# Patient Record
Sex: MALE | Race: WHITE | NOT HISPANIC OR LATINO | Employment: FULL TIME | URBAN - METROPOLITAN AREA
[De-identification: names, ages, dates, MRNs, and addresses within clinical notes are randomized per-mention and may not be internally consistent; named-entity substitution may affect disease eponyms.]

---

## 2017-05-08 ENCOUNTER — OFFICE VISIT - HEALTHEAST (OUTPATIENT)
Dept: SLEEP MEDICINE | Facility: CLINIC | Age: 32
End: 2017-05-08

## 2017-05-08 ENCOUNTER — AMBULATORY - HEALTHEAST (OUTPATIENT)
Dept: SLEEP MEDICINE | Facility: CLINIC | Age: 32
End: 2017-05-08

## 2017-05-08 DIAGNOSIS — G47.33 OSA ON CPAP: ICD-10-CM

## 2017-05-08 ASSESSMENT — MIFFLIN-ST. JEOR: SCORE: 2097.68

## 2017-05-15 ENCOUNTER — AMBULATORY - HEALTHEAST (OUTPATIENT)
Dept: SLEEP MEDICINE | Facility: CLINIC | Age: 32
End: 2017-05-15

## 2017-05-22 ENCOUNTER — AMBULATORY - HEALTHEAST (OUTPATIENT)
Dept: SLEEP MEDICINE | Facility: CLINIC | Age: 32
End: 2017-05-22

## 2017-05-22 ENCOUNTER — COMMUNICATION - HEALTHEAST (OUTPATIENT)
Dept: SLEEP MEDICINE | Facility: CLINIC | Age: 32
End: 2017-05-22

## 2017-06-08 ENCOUNTER — RECORDS - HEALTHEAST (OUTPATIENT)
Dept: ADMINISTRATIVE | Facility: OTHER | Age: 32
End: 2017-06-08

## 2017-06-16 ENCOUNTER — COMMUNICATION - HEALTHEAST (OUTPATIENT)
Dept: FAMILY MEDICINE | Facility: CLINIC | Age: 32
End: 2017-06-16

## 2017-07-11 ENCOUNTER — COMMUNICATION - HEALTHEAST (OUTPATIENT)
Dept: FAMILY MEDICINE | Facility: CLINIC | Age: 32
End: 2017-07-11

## 2017-08-03 ENCOUNTER — OFFICE VISIT - HEALTHEAST (OUTPATIENT)
Dept: FAMILY MEDICINE | Facility: CLINIC | Age: 32
End: 2017-08-03

## 2017-08-03 DIAGNOSIS — R20.0 BILATERAL HAND NUMBNESS: ICD-10-CM

## 2017-08-03 DIAGNOSIS — R20.0 THIGH NUMBNESS: ICD-10-CM

## 2017-08-03 RX ORDER — GLUCOSAMINE SULFATE 500 MG
500 CAPSULE ORAL 3 TIMES DAILY
Status: SHIPPED | COMMUNITY
Start: 2017-08-03 | End: 2023-05-12

## 2017-08-03 ASSESSMENT — MIFFLIN-ST. JEOR: SCORE: 2055.04

## 2017-10-03 ENCOUNTER — RECORDS - HEALTHEAST (OUTPATIENT)
Dept: ADMINISTRATIVE | Facility: OTHER | Age: 32
End: 2017-10-03

## 2017-11-07 ENCOUNTER — OFFICE VISIT - HEALTHEAST (OUTPATIENT)
Dept: FAMILY MEDICINE | Facility: CLINIC | Age: 32
End: 2017-11-07

## 2017-11-07 ENCOUNTER — RECORDS - HEALTHEAST (OUTPATIENT)
Dept: GENERAL RADIOLOGY | Facility: CLINIC | Age: 32
End: 2017-11-07

## 2017-11-07 DIAGNOSIS — R09.82 POSTNASAL DRIP: ICD-10-CM

## 2017-11-07 DIAGNOSIS — R05.9 COUGH: ICD-10-CM

## 2018-01-09 ENCOUNTER — RECORDS - HEALTHEAST (OUTPATIENT)
Dept: ADMINISTRATIVE | Facility: OTHER | Age: 33
End: 2018-01-09

## 2018-01-25 ENCOUNTER — RECORDS - HEALTHEAST (OUTPATIENT)
Dept: ADMINISTRATIVE | Facility: OTHER | Age: 33
End: 2018-01-25

## 2018-04-04 ENCOUNTER — RECORDS - HEALTHEAST (OUTPATIENT)
Dept: ADMINISTRATIVE | Facility: OTHER | Age: 33
End: 2018-04-04

## 2020-08-17 VITALS — HEIGHT: 66 IN | BODY MASS INDEX: 38.57 KG/M2 | WEIGHT: 240 LBS

## 2020-08-17 ASSESSMENT — MIFFLIN-ST. JEOR: SCORE: 1961.38

## 2020-08-17 NOTE — PROGRESS NOTES
"Jose Rapp is a 34 year old male who is being evaluated via a billable video visit.      The patient has been notified of following:     \"This video visit will be conducted via a call between you and your physician/provider. We have found that certain health care needs can be provided without the need for an in-person physical exam.  This service lets us provide the care you need with a video conversation.  If a prescription is necessary we can send it directly to your pharmacy.  If lab work is needed we can place an order for that and you can then stop by our lab to have the test done at a later time.    Video visits are billed at different rates depending on your insurance coverage.  Please reach out to your insurance provider with any questions.    If during the course of the call the physician/provider feels a video visit is not appropriate, you will not be charged for this service.\"    Patient has given verbal consent for Video visit? Yes  How would you like to obtain your AVS? MyChart  If you are dropped from the video visit, the video invite should be resent to: Text to cell phone: [phone]  Will anyone else be joining your video visit? No    Video-Visit Details    Type of service:  Video Visit    Originating Location (pt. Location): Home    Distant Location (provider location):  [facility]     Platform used for Video Visit: Strava     Audio and visual devices were used for this virtual clinic visit with permission from patient.    I spent a total of 10 minutes of face-to-face encounter and more than 50% of the encounter was used for counseling or coordination of care.    Thank you for the opportunity to participate in the care of  Jose Rapp.    Assessment and Plan:    1. Obstructive sleep apnea  Since we are to have a copy of the patient's sleep study in our charts there is no need to repeat the sleep study.  I will therefore write a prescription for the patient to " receive a new CPAP supplies from [employer].  - COMPREHENSIVE DME    2. Hypersomnia  - COMPREHENSIVE DME      History of present illness:    He is a 34 year old male who comes to the virtual clinic for follow-up.  Patient was actually diagnosed with obstructive sleep apnea at our facility on 08/18/2014.  His apnea hypopnea index was grossly elevated at 90.2 events per hour.  The patient has been using CPAP since that time.  He is running out of supplies and wishes to establish care with us so he can get a prescription to get supplies from [employer].  The patient otherwise has no complaints.  His review of system is otherwise negative.     Ideal Sleep-Wake Cycle(devoid of societal pressure):    Patient would try to initiate sleep at around 10-10:30 PM with a sleep latency of less than 10 minutes. The patient would have 2-4 awakenings. Final wake up time is around 7 AM.      Past Medical History  No past medical history on file.     Past Surgical History  No past surgical history on file.     Meds  No current outpatient medications on file.        Allergies  Patient has no known allergies.     Social History  Social History     Socioeconomic History     Marital status:      Spouse name: Not on file     Number of children: Not on file     Years of education: Not on file     Highest education level: Not on file   Occupational History     Not on file   Social Needs     Financial resource strain: Not on file     Food insecurity     Worry: Not on file     Inability: Not on file     Transportation needs     Medical: Not on file     Non-medical: Not on file   Tobacco Use     Smoking status: Not on file   Substance and Sexual Activity     Alcohol use: Not on file     Drug use: Not on file     Sexual activity: Not on file   Lifestyle     Physical activity     Days per week: Not on file     Minutes per session: Not on file     Stress: Not on file   Relationships      "Social connections     Talks on phone: Not on file     Gets together: Not on file     Attends Jew service: Not on file     Active member of club or organization: Not on file     Attends meetings of clubs or organizations: Not on file     Relationship status: Not on file     Intimate partner violence     Fear of current or ex partner: Not on file     Emotionally abused: Not on file     Physically abused: Not on file     Forced sexual activity: Not on file   Other Topics Concern     Not on file   Social History Narrative     Not on file        Family History  No family history on file.  Patient's family history was not pertinent to chief complaint.     Review of Systems:  Constitutional: Negative except as noted in HPI.   Eyes: Negative except as noted in HPI.   ENT: Negative except as noted in HPI.   Cardiovascular: Negative except as noted in HPI.   Respiratory: Negative except as noted in HPI.   Gastrointestinal: Negative except as noted in HPI.   Genitourinary: Negative except as noted in HPI.   Musculoskeletal: Negative except as noted in HPI.   Integumentary: Negative except as noted in HPI.   Neurological: Negative except as noted in HPI.   Psychiatric: Negative except as noted in HPI.   Endocrine: Negative except as noted in HPI.   Hematologic/Lymphatic: Negative except as noted in HPI.      Physical Exam:  Ht 1.676 m (5' 6\")   Wt 108.9 kg (240 lb)   BMI 38.74 kg/m    BMI:Body mass index is 38.74 kg/m .   GEN: NAD,   Head: Normocephalic.  EYES: EOMI  ENT: Oropharynx is clear, De La Fuente class 4+ airway.   Neurological: Alert, oriented to time, place, and person.  Psych: normal mood, normal affect     Labs/Studies:     No results found for: PH, PHARTERIAL, PO2, [id number], SAT, PCO2, HCO3, BASEEXCESS, MARCUS, BEB  No results found for: TSH  No results found for: GLC  No results found for: HGB  No results found for: BUN, CR  No results found for: AST, ALT, GGT, ALKPHOS, BILITOTAL, BILICONJ, BILIDIRECT, " RO  No results found for: UAMP, UBARB, BENZODIAZEUR, UCANN, UCOC, OPIT, UPCP    No components found for: FERRITIN      Patient verbalized understanding of these issues, agrees with the plan and all questions were answered today. Patient was given an opportuntity to voice any other symptoms or concerns not listed above. Patient did not have any other symptoms or concerns.        Yang Garza DO  Board Certified in Internal Medicine and Sleep Medicine    (Note created with Dragon voice recognition and unintended spelling errors and word substitutions may occur)

## 2020-08-17 NOTE — PATIENT INSTRUCTIONS
Your BMI is Body mass index is 38.74 kg/m .  Weight management is a personal decision.  If you are interested in exploring weight loss strategies, the following discussion covers the approaches that may be successful. Body mass index (BMI) is one way to tell whether you are at a healthy weight, overweight, or obese. It measures your weight in relation to your height.  A BMI of 18.5 to 24.9 is in the healthy range. A person with a BMI of 25 to 29.9 is considered overweight, and someone with a BMI of 30 or greater is considered obese. More than two-thirds of American adults are considered overweight or obese.  Being overweight or obese increases the risk for further weight gain. Excess weight may lead to heart disease and diabetes.  Creating and following plans for healthy eating and physical activity may help you improve your health.  Weight control is part of healthy lifestyle and includes exercise, emotional health, and healthy eating habits. Careful eating habits lifelong are the mainstay of weight control. Though there are significant health benefits from weight loss, long-term weight loss with diet alone may be very difficult to achieve- studies show long-term success with dietary management in less than 10% of people. Attaining a healthy weight may be especially difficult to achieve in those with severe obesity. In some cases, medications, devices and surgical management might be considered.  What can you do?  If you are overweight or obese and are interested in methods for weight loss, you should discuss this with your provider.     Consider reducing daily calorie intake by 500 calories.     Keep a food journal.     Avoiding skipping meals, consider cutting portions instead.    Diet combined with exercise helps maintain muscle while optimizing fat loss. Strength training is particularly important for building and maintaining muscle mass. Exercise helps reduce stress, increase energy, and improves fitness.  Increasing exercise without diet control, however, may not burn enough calories to loose weight.       Start walking three days a week 10-20 minutes at a time    Work towards walking thirty minutes five days a week     Eventually, increase the speed of your walking for 1-2 minutes at time    In addition, we recommend that you review healthy lifestyles and methods for weight loss available through the National Institutes of Health patient information sites:  http://win.niddk.nih.gov/publications/index.htm    And look into health and wellness programs that may be available through your health insurance provider, employer, local community center, or marisela club.    Weight management plan: Discussed healthy diet and exercise guidelines  Equipment Instructions    We will process your PAP order and send it to a Durable Medical Equipment (DME) provider.    The medical equipment company should call you within 7 days.  If you have not heard from the company, please contact them to see if they received your order and are planning to call you.    Please call us at [phone] if you are unable to contact the medical equipment company or if they do not have the order.    If you are starting a new PAP machine, please call us after you use it the first night to let us know how it went. This call also helps us know that you received your equipment and that everything is ready. Please use our central phone number [phone]    Contact information for [employer] company:    [employer] Tel: [phone]

## 2020-08-18 ENCOUNTER — VIRTUAL VISIT (OUTPATIENT)
Dept: SLEEP MEDICINE | Facility: CLINIC | Age: 35
End: 2020-08-18
Payer: COMMERCIAL

## 2020-08-18 DIAGNOSIS — G47.33 OBSTRUCTIVE SLEEP APNEA: Primary | ICD-10-CM

## 2020-08-18 DIAGNOSIS — G47.10 HYPERSOMNIA: ICD-10-CM

## 2020-08-18 PROCEDURE — 99212 OFFICE O/P EST SF 10 MIN: CPT | Mod: 95 | Performed by: INTERNAL MEDICINE

## 2021-01-04 ENCOUNTER — HEALTH MAINTENANCE LETTER (OUTPATIENT)
Age: 36
End: 2021-01-04

## 2021-03-31 ENCOUNTER — RECORDS - HEALTHEAST (OUTPATIENT)
Dept: GENERAL RADIOLOGY | Facility: CLINIC | Age: 36
End: 2021-03-31

## 2021-03-31 ENCOUNTER — OFFICE VISIT - HEALTHEAST (OUTPATIENT)
Dept: PODIATRY | Facility: CLINIC | Age: 36
End: 2021-03-31

## 2021-03-31 ENCOUNTER — COMMUNICATION - HEALTHEAST (OUTPATIENT)
Dept: TELEHEALTH | Facility: CLINIC | Age: 36
End: 2021-03-31

## 2021-03-31 DIAGNOSIS — M79.671 PAIN IN RIGHT FOOT: ICD-10-CM

## 2021-03-31 DIAGNOSIS — E66.01 MORBID OBESITY (H): ICD-10-CM

## 2021-03-31 DIAGNOSIS — M21.6X1 PRONATION DEFORMITY OF BOTH FEET: ICD-10-CM

## 2021-03-31 DIAGNOSIS — M21.6X2 PRONATION DEFORMITY OF BOTH FEET: ICD-10-CM

## 2021-03-31 DIAGNOSIS — M79.671 RIGHT FOOT PAIN: ICD-10-CM

## 2021-03-31 ASSESSMENT — MIFFLIN-ST. JEOR: SCORE: 2008.55

## 2021-04-26 ENCOUNTER — RECORDS - HEALTHEAST (OUTPATIENT)
Dept: ADMINISTRATIVE | Facility: OTHER | Age: 36
End: 2021-04-26

## 2021-05-30 VITALS — WEIGHT: 269.4 LBS | HEIGHT: 67 IN | BODY MASS INDEX: 42.28 KG/M2

## 2021-05-31 VITALS — BODY MASS INDEX: 40.81 KG/M2 | HEIGHT: 67 IN | WEIGHT: 260 LBS

## 2021-05-31 VITALS — WEIGHT: 265 LBS | BODY MASS INDEX: 42.13 KG/M2

## 2021-06-05 VITALS
SYSTOLIC BLOOD PRESSURE: 143 MMHG | OXYGEN SATURATION: 98 % | WEIGHT: 255 LBS | BODY MASS INDEX: 42.49 KG/M2 | DIASTOLIC BLOOD PRESSURE: 91 MMHG | HEART RATE: 71 BPM | HEIGHT: 65 IN

## 2021-06-10 NOTE — PROGRESS NOTES
Dear Dr. Teresa Torres MD  [address] [address],    Thank you for the opportunity to participate in the care of Jose Rapp.     He is a 31 y.o. y/o male patient who comes to the sleep medicine clinic for follow up.    He was diagnosed with severe MARCELINO (AHI=90.2)  and has been using CPAP of 10 cwp.  Since our last visit, he changed insurance companies and had to change to a different DME.     He changed insurances again and he needs a reevaluation for supplies.     He has been using his device every night but he is having difficulties due to mask leaks.    Residual AHI: 23.5  Leak: 2.9  Compliance: continues to use his device but he has high leaks    Mask Tolerance: [place]  Skin irritation: no      Past Medical History:   Diagnosis Date     Back pain      Obesity      Sleep apnea     uses CPAP       Past Surgical History:   Procedure Laterality Date     FINGER AMPUTATION Left     lost very tip of finger in doorframe       Social History     Social History     Marital status:      Spouse name: Teofilo     Number of children: 5     Years of education: N/A     Occupational History           student      Social History Main Topics     Smoking status: Never Smoker     Smokeless tobacco: Not on file     Alcohol use No     Drug use: No     Sexual activity: Yes     Partners: Female      Comment: open to pregnancy     Other Topics Concern     Not on file     Social History Narrative       Review of Systems:  General: No weight gain, no weight loss  Eyes: No vision changes  ENT: No hearing changes  Cardio: No chest pain, no nocturnal dyspnea  Respiratory: No shortness of breath, no cough  Gastrointestinal: No diarrhea, no constipation  Genitourinary: No excessive urination  Tegumentary: No rashes  Neurological: No seizures, no loss of consciousness  Endo: No heat or cold intolerance.    Current Outpatient Prescriptions   Medication Sig Dispense Refill     BACILLUS COAGULANS  "(PROBIOTIC, B. COAGULANS, ORAL) Take by mouth.       No current facility-administered medications for this visit.        No Known Allergies    Physical Exam:  /80  Pulse 83  Ht 5' 6.5\" (1.689 m)  Wt (!) 269 lb 6.4 oz (122.2 kg)  SpO2 96%  BMI 42.83 kg/m2  BMI:Body mass index is 42.83 kg/(m^2).   GEN: NAD, obese  Neurological: Alert, oriented to time, place, and person.  Psych: normal mood, normal affect     Labs/Studies:     I reviewed the efficacy and compliance report from his device.    Lab Results   Component Value Date    WBC 6.9 10/30/2015    HGB 16.3 10/30/2015    HCT 46.3 10/30/2015    MCV 89 10/30/2015     10/30/2015         Chemistry        Component Value Date/Time     10/30/2015 1202    K 4.2 10/30/2015 1202     10/30/2015 1202    CO2 26 10/30/2015 1202    BUN 12 10/30/2015 1202    CREATININE 0.75 10/30/2015 1202    GLU 86 10/30/2015 1202        Component Value Date/Time    CALCIUM 10.0 10/30/2015 1202    ALKPHOS 70 10/30/2015 1202    AST 58 (H) 10/30/2015 1202    ALT 93 (H) 10/30/2015 1202    BILITOT 2.9 (H) 10/30/2015 1202          Assessment and Plan:  In summary Jose Rapp is a 31 y.o. year old male who is here for follow up.    1. Obstructive Sleep Apnea  He is having difficulties with mask leaks.  We will order a new supplies.  We will try to see if he can go back to his previous DME ([facility])  He would benefit from a new mask.  He needs some pressure changes but we will wait until his mask is working well to make adjustments.     Patient verbalized understanding of these issues, agrees with the plan and all questions were answered today. Patient was given an opportuntity to voice any other symptoms or concerns not listed above. Patient did not have any other symptoms or concerns.      F/u depending on the results achieved with a new mask and pressure changes.    Vick Mclaughlin MD  [facility] Board Certified in Internal Medicine and Sleep Medicine  [facility]" [facility].

## 2021-06-10 NOTE — PROGRESS NOTES
Order for Durable Medical Equipment was processed and equipment ordered.   DME provider: [facility]  Date Faxed: 05/08/17  Ordering Provider: Dr. Mclaughlin  Equipment ordered: Supplies

## 2021-06-10 NOTE — PROGRESS NOTES
Order for Durable Medical Equipment was processed and equipment ordered.   DME provider: [facility]   Date Faxed: 5/8/17  Ordering Provider: Dr. Mclaughlin  Equipment ordered: Cpap supplies

## 2021-06-12 NOTE — PROGRESS NOTES
S:  31 yom presenting for evaluation of bilateral hand numbness and bilateral thigh numbness with pain.    Thigh numbness:  States this happens off and on x 5-6 years, worse recently as he is working outside the home as his wife is home on maternity leave- normally he stays home.  He is working as a  and is fixing boats.  Notices that he gets pain along the lateral sides of both thighs that is sharp and throbbing, says that the skin feels numb when the pain occurs.  It is worse with standing and with increasing activity, walking can trigger the pain.  He was told in the past that he needs to lose weight to improve the problem- states he doesn't have time to workout, says that he has lost 5 pounds and has not noticed any difference in his symptoms.    No back pain whatsoever.  No history of injury or trauma.  No numbness elsewhere in the legs.  No muscle weakness or loss of coordination.  No bowel or bladder issues, no perineal numbness.    Bilateral hand numbness:  This comes on only with certain positions, usually when he is flexed at the elbow holding his phone to his ear or while gripping something.  Says he sometimes drops things because of the numbness and his hands feel clumsy.  Numbness involves all 5 fingers and feels like they are tingly and asleep.  This does not wake him from sleep, he does not have symptoms in the morning.  No neck injury or neck pain.  No new activities.  This has started since he has been working outside of the home as well.    He states that he is not sure what to do next- he is concerned about cost of testing and doctors visits.      When asked about his weight, he states he doesn't know where to start.  He has considered the [place] weight loss program but this will be out of pocket cost for him which he cannot afford.  When he graduated high school he weighed 150 pounds, was very active in sports etc.  The weight has slowly gone up and up.  He has never been successful  with losing weight.  Diet consists of mostly boxed foods, pasta, breads.  No sugary drinks.      Patient Active Problem List    Diagnosis Date Noted     Meralgia Paresthetica On The Left      Obesity      Snoring (Symptom)      Current Outpatient Prescriptions on File Prior to Visit   Medication Sig Dispense Refill     BACILLUS COAGULANS (PROBIOTIC, B. COAGULANS, ORAL) Take by mouth.       No current facility-administered medications on file prior to visit.      O:  Vitals:    08/03/17 1459   BP: 130/84   Pulse: 76   Resp: 18     Body mass index is 41.34 kg/(m^2).    Gen alert pleasant obese  Heart rrr no murmurs  Lungs clear no wheezing or crackles  Abd obese, nontender  Ext no midline bony tenderness of the spine.  Negative Spurling test bilaterally.  Negative straight leg raise bilaterally.  No paraspinal muscle tenderness.  Full ROM of neck  Neuro  Full ROM and strength 5/5 in fingers, wrists and arms bilaterally.  Muscles are symmetric.  Sensation intact to light touch bilaterally.    sensation intact to light touch bilateral thighs and lower legs.  Muscles are symmetric, strength 5/5 bilaterally, reflexes 2+ at the patella.  Ambulating normally.    Skin normal without color changes or rashes    A/P:  Intermittent bilateral thigh and hand numbness:  Symptoms seem more like peripheral nerve impingement due to positioning, do not suspect central cord involvement since symptoms are intermittent and resolve completely between episodes which I discussed with the patient.  At this point I'm not sure if there is a role for imaging of the spine as his symptoms are not consistent with cord involvement.  Pain is intermittent, I do not think there is any utility for gabapentin.  - I do think that his symptoms are related to his obesity- his BMI is 41, reviewed this at length, however patient seems to be in the pre-contemplative stage in regards to his obesity and has poor insight into how body habitus is impacting his  functioning.  I did encourage him to consider some diet changes to promote weight loss and certainly the weight loss program would be ideal but cost is an issue  -I discussed next options with the patient:  Trial of physical therapy vs referral to Neurologist to see if imaging or other testing (EMG) would be appropriate.  Patient opted to see a Neurologist, referral placed for further assistance in diagnostic work-up.    Ana Luisa Leon MD

## 2021-06-14 NOTE — PROGRESS NOTES
LIZET Rapp is a 32 y.o. male here for a cough.  This has been present for about 4 weeks. 2 weeks ago, he went to a different urgent care and they said it was viral and it would get better.  It is not really getting worse, but not getting any better.  They did not do any imaging.  He has had no fevers, no nasal congestion, no shortness of breath.  He does not smoke.  No asthma.  Past Medical History:   Diagnosis Date     Back pain      Obesity      Sleep apnea     uses CPAP     Current Outpatient Prescriptions on File Prior to Visit   Medication Sig Dispense Refill     BACILLUS COAGULANS (PROBIOTIC, B. COAGULANS, ORAL) Take by mouth.       CHROM JUNIOR/BRINDAL BERRY (GARCINIA CAMBOGIA ORAL) Take by mouth.       co-enzyme Q-10 30 mg capsule Take 30 mg by mouth 3 (three) times a day.       glucosamine sulfate 500 mg cap Take 500 mg by mouth 3 (three) times a day.       No current facility-administered medications on file prior to visit.      Social Hx:  Does not smoke.  ?  ROS:   General: No fevers, chills, weight loss  Head: No headaches   Ears: No ear pain  Oropharynx: No sore throat.  Mild congestion.  CV: No chest pain or palpitations.  Resp: No shortness of breath.  Positive for cough. No hemoptysis.  GI: No nausea, vomiting, diarrhea, abdominal pain?  O  /76  Pulse 81  Temp 98.2  F (36.8  C) (Oral)   Resp 20  Wt (!) 265 lb (120.2 kg)  SpO2 98%  BMI 42.13 kg/m2   Vitals reviewed. Nursing note reviewed.  General Appearance: Pleasant and alert, in no acute distress  HEENT: TMs clear, oropharynx without edema or exudate, mucous membranes moist, neck supple, no lymphadenopathy  CV: RRR, no murmur, rubs, gallops  Resp: No respiratory distress. Clear to auscultation bilaterally. No wheezes, rales, rhonchi. Coarse cough during exam.  Neuro: no focal deficits, CNs II-XII normal.   A/P  Jose was seen today for cough and headache.    Diagnoses and all orders for this visit:    Cough: X-ray is negative for  any pneumonia.  Discussed symptomatic treatment including Benadryl, Tylenol, cough syrup and/or honey.  If he does develop a fever or becomes more short of breath, he should return to walking care.  We discussed that this is likely a virus, especially because his infant daughter is also sick, antibiotics are not warranted.  -     XR Chest PA and Lateral; Future    Postnasal drip: He has not noticed much congestion, we will try Flonase for postnasal drip.  -     fluticasone (FLONASE) 50 mcg/actuation nasal spray; 1 spray into each nostril daily.      Options for treatment and follow-up care were reviewed with the patient and/or guardian. Jose Rapp and/or guardian engaged in the decision making process and verbalized understanding of the options discussed and agreed with the final plan.    Tiffanie Virgen MD

## 2021-06-16 PROBLEM — E66.01 MORBID OBESITY (H): Status: ACTIVE | Noted: 2021-03-31

## 2021-06-16 NOTE — PROGRESS NOTES
Progress Notes by Raf Velasquez DPM at 3/31/2021  9:00 AM     Author: Raf Velasquez DPM Service: -- Author Type: Physician    Filed: 6/30/2021  7:43 AM Encounter Date: 3/31/2021 Status: Addendum    : Raf Velasquez DPM (Physician)    Related Notes: Original Note by Rfa Velasquez DPM (Physician) filed at 3/31/2021  9:42 AM       FOOT AND ANKLE SURGERY/PODIATRY CONSULT NOTE         ASSESSMENT:   Right foot pain  Pronation deformity      TREATMENT:  An x-ray of the right foot was taken today.  I informed the patient the x-rays were negative for any osseous or soft tissue abnormalities.  The patient does have some flattening of the medial longitudinal arch.  I told the patient this could be stressing his lateral column.  I am going to initially recommend orthotics and Tylenol for pain.  If the patient's pain persist I recommend he return to the clinic for follow-up visit.       HPI: Jose Rapp presented to the clinic today complaining of moderate severe aching type pain involving his right foot.  The patient stated that the pain is located between the fourth and fifth toes of his right foot.  He has had this pain for 2 months.  The pain is aggravated with weightbearing and ambulation.  He denies any trauma to the right foot.  He has not had any redness associated with his symptoms.  He has mild pain while resting if he has been on his foot for several hours.  The pain is nonradiating.  He denies any other previous treatment.  The pain is only relieved with nonweightbearing.   Past Medical History:   Diagnosis Date   ? Back pain    ? Obesity    ? Sleep apnea     uses CPAP       Past Surgical History:   Procedure Laterality Date   ? FINGER AMPUTATION Left     lost very tip of finger in doorframe       No Known Allergies      Current Outpatient Medications:   ?  BACILLUS COAGULANS (PROBIOTIC, B. COAGULANS, ORAL), Take by mouth., Disp: , Rfl:   ?  CHROM JUNIOR/BRINDAL BERRY (GARCINIA CAMBOGIA  ORAL), Take by mouth., Disp: , Rfl:   ?  co-enzyme Q-10 30 mg capsule, Take 30 mg by mouth 3 (three) times a day., Disp: , Rfl:   ?  cyclobenzaprine (FLEXERIL) 10 MG tablet, Take 1 tablet (10 mg total) by mouth 2 (two) times a day as needed for muscle spasms., Disp: 20 tablet, Rfl: 0  ?  fluticasone (FLONASE) 50 mcg/actuation nasal spray, 1 spray into each nostril daily., Disp: 16 g, Rfl: 2  ?  glucosamine sulfate 500 mg cap, Take 500 mg by mouth 3 (three) times a day., Disp: , Rfl:   ?  naproxen (NAPROSYN) 500 MG tablet, Take 1 tablet (500 mg total) by mouth 2 (two) times a day with meals., Disp: 30 tablet, Rfl: 0    Family History   Adopted: Yes   Problem Relation Age of Onset   ? Diabetes Father    ? Heart disease Father        Social History     Socioeconomic History   ? Marital status:      Spouse name: Teofilo   ? Number of children: 5   ? Years of education: Not on file   ? Highest education level: Not on file   Occupational History   ? Occupation:    ? Occupation: student   Social Needs   ? Financial resource strain: Not on file   ? Food insecurity     Worry: Not on file     Inability: Not on file   ? Transportation needs     Medical: Not on file     Non-medical: Not on file   Tobacco Use   ? Smoking status: Never Smoker   Substance and Sexual Activity   ? Alcohol use: No   ? Drug use: No   ? Sexual activity: Yes     Partners: Female     Comment: open to pregnancy   Lifestyle   ? Physical activity     Days per week: Not on file     Minutes per session: Not on file   ? Stress: Not on file   Relationships   ? Social connections     Talks on phone: Not on file     Gets together: Not on file     Attends Confucianist service: Not on file     Active member of club or organization: Not on file     Attends meetings of clubs or organizations: Not on file     Relationship status: Not on file   ? Intimate partner violence     Fear of current or ex partner: Not on file     Emotionally abused: Not on file      Physically abused: Not on file     Forced sexual activity: Not on file   Other Topics Concern   ? Not on file   Social History Narrative   ? Not on file       Review of Systems - Patient denies fever, chills, rash, wound, stiffness, limping, numbness, weakness, heart burn, blood in stool, chest pain with activity, calf pain when walking, shortness of breath with activity, chronic cough, easy bleeding/bruising, swelling of ankles, excessive thirst, fatigue, depression, anxiety.  Patient admits to right foot pain x2 months.      OBJECTIVE:  Appearance: alert, well appearing, and in no distress.    There were no vitals filed for this visit.    BMI= There is no height or weight on file to calculate BMI.    General appearance: Patient is alert and fully cooperative with history & exam.  No sign of distress is noted during the visit.  Psychiatric: Affect is pleasant & appropriate.  Patient appears motivated to improve health.  Respiratory: Breathing is regular & unlabored while sitting.  HEENT: Hearing is intact to spoken word.  Speech is clear.  No gross evidence of visual impairment that would impact ambulation.    Vascular: Dorsalis pedis and posterior tibial pulses are palpable. There is pedal hair growth bilaterally.  CFT < 3 sec from anterior tibial surface to distal digits bilaterally. There is no appreciable edema noted.  Dermatologic: Turgor and texture are within normal limits. No coloration or temperature changes. No primary or secondary lesions noted.  Neurologic: All epicritic and proprioceptive sensations are grossly intact bilaterally.  Musculoskeletal: All active and passive ankle, subtalar, midtarsal, and 1st MPJ range of motion are grossly intact without pain or crepitus, with the exception of pain at the base of the fourth and fifth metatarsals. Manual muscle strength is within normal limits bilaterally. All dorsiflexors, plantarflexors, invertors, evertors are intact bilaterally. Tenderness present  to base of the fourth and fifth metatarsals on palpation. Tenderness to base of the fourth and fifth metatarsals with range of motion.  There is flattening of the medial longitudinal arch noted bilaterally upon weightbearing.  Calf is soft/non-tender without warmth/induration    Imaging:         No results found.       Raf Velasquez; CARLOS  [facility] Foot [facility] Ankle Surgery/Podiatry

## 2021-06-16 NOTE — PATIENT INSTRUCTIONS - HE
What are Prescription Custom Orthotics?  Custom orthotics are specially-made devices designed to support and comfort your feet. Prescription orthotics are crafted for you and no one else. They match the contours of your feet precisely and are designed for the way you move. Orthotics are only manufactured after a podiatrist has conducted a complete evaluation of your feet, ankles, and legs, so the orthotic can accommodate your unique foot structure and pathology.  Prescription orthotics are divided into two categories:    Functional orthotics are designed to control abnormal motion. They may be used to treat foot pain caused by abnormal motion; they can also be used to treat injuries such as shin splints or tendinitis. Functional orthotics are usually crafted of a semi-rigid material such as plastic or graphite.    Accommodative orthotics are softer and meant to provide additional cushioning and support. They can be used to treat diabetic foot ulcers, painful calluses on the bottom of the foot, and other uncomfortable conditions.  Podiatrists use orthotics to treat foot problems such as plantar fasciitis, bursitis, tendinitis, diabetic foot ulcers, and foot, ankle, and heel pain. Clinical research studies have shown that podiatrist-prescribed foot orthotics decrease foot pain and improve function.  Orthotics typically cost more than shoe inserts purchased in a retail store, but the additional cost is usually well worth it. Unlike shoe inserts, orthotics are molded to fit each individual foot, so you can be sure that your orthotics fit and do what they're supposed to do. Prescription orthotics are also made of top-notch materials and last many years when cared for properly. Insurance often helps pay for prescription orthotics.  What are Shoe Inserts?   You've seen them at the grocery store and at the mall. You've probably even seen them on TV and online. Shoe inserts are any kind of non-prescription foot support  designed to be worn inside a shoe. Pre-packaged, mass produced, arch supports are shoe inserts. So are the  custom-made  insoles and foot supports that you can order online or at retail stores. Unless the device has been prescribed by a doctor and crafted for your specific foot, it's a shoe insert, not a custom orthotic device--despite what the ads might say.  Shoe inserts can be very helpful for a variety of foot ailments, including flat arches and foot and leg pain. They can cushion your feet, provide comfort, and support your arches, but they can't correct biomechanical foot problems or cure long-standing foot issues.  The most common types of shoe inserts are:    Arch supports: Some people have high arches. Others have low arches or flat feet. Arch supports generally have a  bumped-up  appearance and are designed to support the foot's natural arch.     Insoles: Insoles slip into your shoe to provide extra cushioning and support. Insoles are often made of gel, foam, or plastic.     Heel liners: Heel liners, sometimes called heel pads or heel cups, provide extra cushioning in the heel region. They may be especially useful for patients who have foot pain caused by age-related thinning of the heels' natural fat pads.     Foot cushions: Do your shoes rub against your heel or your toes? Foot cushions come in many different shapes and sizes and can be used as a barrier between you and your shoe.  Choosing an Over-the-Counter Shoe Insert  Selecting a shoe insert from the wide variety of devices on the market can be overwhelming. Here are some podiatrist-tested tips to help you find the insert that best meets your needs:    Consider your health. Do you have diabetes? Problems with circulation? An over-the-counter insert may not be your best bet. Diabetes and poor circulation increase your risk of foot ulcers and infections, so schedule an appointment with a podiatrist. He or she can help you select a solution that won't  cause additional health problems.     Think about the purpose. Are you planning to run a marathon, or do you just need a little arch support in your work shoes? Look for a product that fits your planned level of activity.     Bring your shoes. For the insert to be effective, it has to fit into your shoes. So bring your sneakers, dress shoes, or work boots--whatever you plan to wear with your insert. Look for an insert that will fit the contours of your shoe.     Try them on. If all possible, slip the insert into your shoe and try it out. Walk around a little. How does it feel? Don't assume that feelings of pressure will go away with continued wear. (If you can't try the inserts at the store, ask about the store's return policy and hold on to your receipt.)    Please call one of the [place] locations below to schedule an appointment. If you received a prescription please bring it with you to your appointment. Some locations are limited to what they carry.    Office Locations    [facility]  [address] [place] [address]  Home Medical Equipment, [address]   Phone: [phone]   Orthotics and Prosthetics, [address]   Phone: [phone]    [facility]  Home Medical Equipment  [address] [address]   Phone: [phone]    Orthotics and Prosthetics ([facility])    [address], [address], [place] [address]  Phone: [phone]    [facility]  [address]   [place] [address]   Phone: [phone]    [facility]  [address]  [place] [address]  Phone: [phone]    [facility] Bld[facility]   [address]  [place] [address]  Phone: [phone]    [facility]  [address]  [place] [address]  Phone: [phone]    [facility]  [address]  [place] [address]  Phone: [phone]    [address] [place] [address]   Phone: [phone]

## 2021-06-30 ENCOUNTER — RECORDS - HEALTHEAST (OUTPATIENT)
Dept: ADMINISTRATIVE | Facility: OTHER | Age: 36
End: 2021-06-30

## 2021-08-17 ENCOUNTER — TRANSFERRED RECORDS (OUTPATIENT)
Dept: HEALTH INFORMATION MANAGEMENT | Facility: CLINIC | Age: 36
End: 2021-08-17

## 2021-08-18 ENCOUNTER — TRANSFERRED RECORDS (OUTPATIENT)
Dept: HEALTH INFORMATION MANAGEMENT | Facility: CLINIC | Age: 36
End: 2021-08-18

## 2021-08-24 DIAGNOSIS — G47.33 OBSTRUCTIVE SLEEP APNEA (ADULT) (PEDIATRIC): Primary | ICD-10-CM

## 2021-10-11 ENCOUNTER — HEALTH MAINTENANCE LETTER (OUTPATIENT)
Age: 36
End: 2021-10-11

## 2021-11-08 ENCOUNTER — TELEPHONE (OUTPATIENT)
Dept: SLEEP MEDICINE | Facility: CLINIC | Age: 36
End: 2021-11-08
Payer: COMMERCIAL

## 2021-11-08 NOTE — TELEPHONE ENCOUNTER
Reason for Call:  Other call back    Detailed comments: patients wife called said the first 3 hours his body sounds like he is fighting for air and then he stops breathing. After he gets into a deep sleep everything is okay    Phone Number Patient can be reached at: Cell number on file:    Telephone Information:   Mobile [phone]       Best Time: anytime    Can we leave a detailed message on this number? YES    Call taken on 11/8/2021 at 12:41 PM by Isabella Hall

## 2021-11-10 NOTE — TELEPHONE ENCOUNTER
Left voicemail.  Advised patient that he should schedule a follow up appointment with Dr. Garza to address concerns.

## 2021-12-14 ENCOUNTER — OFFICE VISIT (OUTPATIENT)
Dept: FAMILY MEDICINE | Facility: CLINIC | Age: 36
End: 2021-12-14
Payer: COMMERCIAL

## 2021-12-14 VITALS
HEART RATE: 77 BPM | SYSTOLIC BLOOD PRESSURE: 128 MMHG | OXYGEN SATURATION: 96 % | DIASTOLIC BLOOD PRESSURE: 86 MMHG | BODY MASS INDEX: 44.1 KG/M2 | TEMPERATURE: 97.4 F | WEIGHT: 281 LBS | HEIGHT: 67 IN

## 2021-12-14 DIAGNOSIS — Z00.00 ROUTINE GENERAL MEDICAL EXAMINATION AT A HEALTH CARE FACILITY: Primary | ICD-10-CM

## 2021-12-14 DIAGNOSIS — Z13.220 SCREENING FOR HYPERLIPIDEMIA: ICD-10-CM

## 2021-12-14 DIAGNOSIS — Z13.1 SCREENING FOR DIABETES MELLITUS: ICD-10-CM

## 2021-12-14 DIAGNOSIS — R35.1 NOCTURIA: ICD-10-CM

## 2021-12-14 DIAGNOSIS — Z11.59 NEED FOR HEPATITIS C SCREENING TEST: ICD-10-CM

## 2021-12-14 DIAGNOSIS — G47.33 OSA (OBSTRUCTIVE SLEEP APNEA): ICD-10-CM

## 2021-12-14 DIAGNOSIS — Z11.4 SCREENING FOR HIV (HUMAN IMMUNODEFICIENCY VIRUS): ICD-10-CM

## 2021-12-14 LAB
ALBUMIN UR-MCNC: NEGATIVE MG/DL
APPEARANCE UR: CLEAR
BILIRUB UR QL STRIP: NEGATIVE
CHOLEST SERPL-MCNC: 157 MG/DL
COLOR UR AUTO: YELLOW
FASTING STATUS PATIENT QL REPORTED: ABNORMAL
FASTING STATUS PATIENT QL REPORTED: NORMAL
GLUCOSE BLD-MCNC: 98 MG/DL (ref 70–125)
GLUCOSE UR STRIP-MCNC: NEGATIVE MG/DL
HDLC SERPL-MCNC: 29 MG/DL
HGB UR QL STRIP: NEGATIVE
HIV 1+2 AB+HIV1 P24 AG SERPL QL IA: NEGATIVE
KETONES UR STRIP-MCNC: NEGATIVE MG/DL
LDLC SERPL CALC-MCNC: 72 MG/DL
LEUKOCYTE ESTERASE UR QL STRIP: NEGATIVE
NITRATE UR QL: NEGATIVE
PH UR STRIP: 5.5 [PH] (ref 5–8)
RBC #/AREA URNS AUTO: NORMAL /HPF
SP GR UR STRIP: >=1.03 (ref 1–1.03)
TRIGL SERPL-MCNC: 281 MG/DL
UROBILINOGEN UR STRIP-ACNC: 0.2 E.U./DL
WBC #/AREA URNS AUTO: NORMAL /HPF

## 2021-12-14 PROCEDURE — 36415 COLL VENOUS BLD VENIPUNCTURE: CPT | Performed by: FAMILY MEDICINE

## 2021-12-14 PROCEDURE — 99385 PREV VISIT NEW AGE 18-39: CPT | Performed by: FAMILY MEDICINE

## 2021-12-14 PROCEDURE — 86803 HEPATITIS C AB TEST: CPT | Performed by: FAMILY MEDICINE

## 2021-12-14 PROCEDURE — 82947 ASSAY GLUCOSE BLOOD QUANT: CPT | Performed by: FAMILY MEDICINE

## 2021-12-14 PROCEDURE — 81001 URINALYSIS AUTO W/SCOPE: CPT | Performed by: FAMILY MEDICINE

## 2021-12-14 PROCEDURE — 80061 LIPID PANEL: CPT | Performed by: FAMILY MEDICINE

## 2021-12-14 PROCEDURE — 87389 HIV-1 AG W/HIV-1&-2 AB AG IA: CPT | Performed by: FAMILY MEDICINE

## 2021-12-14 ASSESSMENT — MIFFLIN-ST. JEOR: SCORE: 2153.24

## 2021-12-14 NOTE — PROGRESS NOTES
SUBJECTIVE:   CC: Jose Rapp is an 36 year old male who presents for preventative health visit.     Patient has been advised of split billing requirements and indicates understanding: Yes  HPI    Patient was evaluated at [facility] on 11/28/21 for fatigue, myalgias, HA, and loss of taste. COVID-19 testing negative. Patient has not previously been vaccinated.  Major risk factors include obesity.  Patient declines vaccination against COVID-19 and influenza today, though both were advised. He notes that the above noted symptoms have resolved.     History of MARCELINO with difficulty breathing at night despite use of CPAP.  Is planning to schedule a follow-up with his sleep specialist.   He is also reporting 2-3 episodes of nocturia for the past few months. He does not report increased thirst or weight changes recently. No known history of diabetes. Denies associated dysuria, hesitancy, hematuria or daytime frequency.      Patient notes history of left knee meniscus tear diagnosed this past summer and is strongly considering having arthroscopic surgery to repair this .  It is affecting his general mobility.     Today's PHQ-2 Score:   PHQ-2 ( 1999 Pfizer) 12/14/2021   Q1: Little interest or pleasure in doing things 0   Q2: Feeling down, depressed or hopeless 0   PHQ-2 Score 0   Q1: Little interest or pleasure in doing things Not at all   Q2: Feeling down, depressed or hopeless Not at all   PHQ-2 Score 0     Have you ever done Advance Care Planning? (For example, a Health Directive, POLST, or a discussion with a medical provider or your loved ones about your wishes): patient declines information regarding advance care planning.     Social History     Tobacco Use     Smoking status: Never Smoker     Smokeless tobacco: Never Used   Substance Use Topics     Alcohol use: No     Alcohol Use 12/14/2021   Prescreen: >3 drinks/day or >7 drinks/week? Not Applicable   Prescreen: >3 drinks/day or >7 drinks/week? -     Last PSA:  N/a  "    Reviewed orders with patient. Reviewed health maintenance and updated orders accordingly - Yes  Lab work is in process    Reviewed and updated as needed this visit by clinical staff                Reviewed and updated as needed this visit by Provider               Past Medical History:   Diagnosis Date     Back pain      Obesity      Sleep apnea     uses CPAP      Past Surgical History:   Procedure Laterality Date     AMPUTATE FINGER(S) Left     lost very tip of finger in doorframe       Review of Systems  CONSTITUTIONAL: NEGATIVE for fever, chills, change in weight  INTEGUMENTARY/SKIN: NEGATIVE for worrisome rashes, moles or lesions  EYES: NEGATIVE for vision changes or irritation  ENT: NEGATIVE for ear, mouth and throat problems  RESP: NEGATIVE for significant cough or SOB  CV: NEGATIVE for chest pain, palpitations or peripheral edema  GI: NEGATIVE for nausea, abdominal pain, heartburn, or change in bowel habits   male: negative for dysuria, hematuria, decreased urinary stream, erectile dysfunction, urethral discharge  MUSCULOSKELETAL: NEGATIVE for significant arthralgias or myalgia  NEURO: NEGATIVE for weakness, dizziness or paresthesias  PSYCHIATRIC: NEGATIVE for changes in mood or affect    OBJECTIVE:   /86   Pulse 77   Temp 97.4  F (36.3  C)   Ht 1.702 m (5' 7\")   Wt 127.5 kg (281 lb)   SpO2 96%   BMI 44.01 kg/m      Physical Exam  GENERAL: healthy, alert and no distress  EYES: Eyes grossly normal to inspection, PERRL and conjunctivae and sclerae normal  HENT: ear canals and TM's normal, nose and mouth without ulcers or lesions  NECK: no adenopathy, no asymmetry, masses, or scars and thyroid normal to palpation  RESP: lungs clear to auscultation - no rales, rhonchi or wheezes  CV: regular rate and rhythm, normal S1 S2  ABDOMEN: soft, nontender, no hepatosplenomegaly, no masses and bowel sounds normal  SKIN: no suspicious lesions or rashes  NEURO: Normal strength and tone, mentation intact and " speech normal  PSYCH: mentation appears normal, affect normal/bright     Component      Latest Ref Rng & Units 12/14/2021 12/14/2021           8:55 AM  8:55 AM   Color Urine      Colorless, Straw, Light Yellow, Yellow Yellow    Appearance Urine      Clear Clear    Glucose Urine      Negative mg/dL Negative    Bilirubin Urine      Negative Negative    Ketones Urine      Negative mg/dL Negative    Specific Gravity Urine      1.005 - 1.030 >=1.030    Blood Urine      Negative Negative    pH Urine      5.0 - 8.0 5.5    Protein Albumin Urine      Negative mg/dL Negative    Urobilinogen Urine      0.2, 1.0 E.U./dL 0.2    Nitrite Urine      Negative Negative    Leukocyte Esterase Urine      Negative Negative    Cholesterol      <=199 mg/dL 157    Triglycerides      <=149 mg/dL 281 (H)    HDL Cholesterol      >=40 mg/dL 29 (L)    LDL Cholesterol Calculated      <=129 mg/dL 72    Patient Fasting > 8hrs?       Unknown Unknown   Glucose      70 - 125 mg/dL  98   RBC Urine      0-2 /HPF /HPF None Seen    WBC Urine      0-5 /HPF /HPF 0-5    HIV Antigen Antibody Combo      Negative Negative    Hepatitis C Antibody      Nonreactive Nonreactive        ASSESSMENT/PLAN:   Jose was seen today for physical.    Diagnoses and all orders for this visit:    Routine general medical examination at a health care facility    Screening for HIV (human immunodeficiency virus)  -     HIV Antigen Antibody Combo; Future  -     HIV Antigen Antibody Combo    Need for hepatitis C screening test  -     Hepatitis C Screen Reflex to HCV RNA Quant and Genotype; Future  -     Hepatitis C Screen Reflex to HCV RNA Quant and Genotype    Screening for hyperlipidemia  -     Lipid panel reflex to direct LDL Fasting; Future  -     Lipid panel reflex to direct LDL Fasting    Screening for diabetes mellitus  -     Glucose; Future  -     Glucose    Nocturia  -     UA with Microscopic reflex to Culture - Clinic Collect  -     Urine Microscopic    MARCELINO (obstructive  "sleep apnea)  -     SLEEP EVALUATION & MANAGEMENT REFERRAL - ADULT -; Future    Other orders  -     REVIEW OF HEALTH MAINTENANCE PROTOCOL ORDERS    Patient has been advised of split billing requirements and indicates understanding: Yes  COUNSELING:   Reviewed preventive health counseling, as reflected in patient instructions  Special attention given to:        Regular exercise       Healthy diet/nutrition       Immunizations    Declined: COVID-19 and Influenza due to Other patient reports concerns regarding it's safety.  Extensive discussion undertaken regarding safety of both vaccines, and the potential for significant illness were he to become infected,  after which patient declines vaccines.  He is invited to contact us at any point in the future  If he would like to reconsider.            Consider Hep C screening for all patients one time for ages 18-79 years       HIV screeninx in teen years, 1x in adult years, and at intervals if high risk    Estimated body mass index is 42.43 kg/m  as calculated from the following:    Height as of 3/31/21: 1.651 m (5' 5\").    Weight as of 3/31/21: 115.7 kg (255 lb).     Weight management plan: Discussed healthy diet and exercise guidelines    He reports that he has never smoked. He has never used smokeless tobacco.    Gabino Pittman MD  [facility]  "

## 2021-12-14 NOTE — PATIENT INSTRUCTIONS
Patient Education     Prevention Guidelines, Men Ages 40 to 49  Screening tests and vaccines are an important part of managing your health. A screening test is done to find possible disorders or diseases in people who don't have any symptoms. The goal is to find a disease early so lifestyle changes can be made and you can be watched more closely to reduce the risk of disease, or to detect it early enough to treat it most effectively. Screening tests are not considered diagnostic, but are used to determine if more testing is needed. Health counseling is essential, too. Below are guidelines for these, for men ages 40 to 49. Talk with your healthcare provider to make sure you re up to date on what you need.  Screening Who needs it How often   Alcohol misuse All men in this age group At routine exams   Blood pressure All men in this age group Yearly checkup if your blood pressure reading is normal  Normal blood pressure is less than 120/80 mm Hg  If your blood pressure is higher than normal, follow the advice of your healthcare provider      Depression All men in this age group At routine exams   Type 2 diabetes or prediabetes All men beginning at age 45 and men  without symptoms at any age who are overweight or obese and have 1 or more other risk factors for diabetes At least every 3 years (yearly if blood sugar has begun to rise)   Type 2 diabetes All men with prediabetes Every year   Hepatitis C Men at increased risk for infection - talk with your healthcare provider At routine exams   High cholesterol or triglycerides All men ages 35 and older, and younger men at high risk for coronary artery disease At least every 5 years   HIV All men At routine exams   Obesity All men in this age group At routine exams   Prostate cancer Starting at age 45, talk to healthcare provider about risks and benefits of digital rectal exam (CARLYN) and prostate-specific antigen (PSA) screening1 At routine exams   Syphilis Men at increased  risk for infection - talk with your healthcare provider At routine exams   Tuberculosis Men at increased risk for infection - talk with your healthcare provider Check with your healthcare provider   Vision All men in this age group Every 2 to 4 years if no risk factors for eye disease2   Vaccine Who needs it How often   Chickenpox (varicella) All men in this age group who have no record of this infection or vaccine 2 doses; the second dose should be given at least 4 weeks after the first dose   Hepatitis A Men at increased risk for infection - talk with your healthcare provider 2 doses given at least 6 months apart   Hepatitis B Men at increased risk for infection - talk with your healthcare provider 3 doses over 6 months; second dose should be given 1 month after the first dose; the third dose should be given at least 2 months after the second dose and at least 4 months after the first dose   Haemophilus influenzae Type B (HIB) Men at increased risk for infection - talk with your healthcare provider 1 to 3 doses   Influenza (flu) All men in this age group Once a year   Measles, mumps, rubella (MMR) All men in this age group who have no record of these infections or vaccines 1 or 2 doses   Meningococcal Men at increased risk for infection - talk with your healthcare provider 1 or more doses   Pneumococcal conjugate vaccine (PCV13) and pneumococcal polysaccharide vaccine (PPSV23) Men at increased risk for infection - talk with your healthcare provider PCV13: 1 dose ages 19 to 65 (protects against 13 types of pneumococcal bacteria)     PPSV23: 1 to 2 doses through age 64, or 1 dose at 65 or older (protects against 23 types of pneumococcal bacteria)      Tetanus/diphtheria/  pertussis (Td/Tdap) booster All men in this age group Td every 10 years, or a one-time dose of Tdap instead of a Td booster after age 18, then Td every 10 years   Counseling Who needs it How often   Diet and exercise Men who are overweight or obese  When diagnosed, and then at routine exams   Sexually transmitted infection prevention Men at increased risk for infection - talk with your healthcare provider At routine exams   Use of daily aspirin Men ages 45 to 79 at risk for cardiovascular health problems At routine exams   Use of tobacco and the health effects it can cause All men in this age group Every exam   [address] [place] [address] [facility]   [facility] [employer]  StayWell last reviewed this educational content on 2/1/2017 2000-2021 [employer]. All rights reserved. This information is not intended as a substitute for professional medical care. Always follow your healthcare professional's instructions.           Preventive Health Recommendations  Male Ages 26 - 39    Yearly exam:             See your health care provider every year in order to  o   Review health changes.   o   Discuss preventive care.    o   Review your medicines if your doctor has prescribed any.    You should be tested each year for STDs (sexually transmitted diseases), if you re at risk.     After age 35, talk to your provider about cholesterol testing. If you are at risk for heart disease, have your cholesterol tested at least every 5 years.     If you are at risk for diabetes, you should have a diabetes test (fasting glucose).  Shots: Get a flu shot each year. Get a tetanus shot every 10 years.     Nutrition:    Eat at least 5 servings of fruits and vegetables daily.     Eat whole-grain bread, whole-wheat pasta and brown rice instead of white grains and rice.     Get adequate Calcium and Vitamin D.     Lifestyle    Exercise for at least 150 minutes a week (30 minutes a day, 5 days a week). This will help you control your weight and prevent disease.     Limit alcohol to one drink per day.     No smoking.     Wear sunscreen to prevent skin cancer.     See your dentist every six months for an exam and cleaning.

## 2021-12-15 LAB — HCV AB SERPL QL IA: NONREACTIVE

## 2022-01-17 PROBLEM — G47.33 OSA (OBSTRUCTIVE SLEEP APNEA): Status: ACTIVE | Noted: 2022-01-17

## 2022-02-04 ENCOUNTER — TELEPHONE (OUTPATIENT)
Dept: SLEEP MEDICINE | Facility: CLINIC | Age: 37
End: 2022-02-04
Payer: COMMERCIAL

## 2022-02-04 NOTE — TELEPHONE ENCOUNTER
Attempted to reach patient via phone unsuccessful left message manual download needed prior to appointment. Patient to reach out to [employer] or call back to set up a date and time to bring his machine to [facility]. Fax number given to patient.       Amber Garcia MANUEL  [facility]

## 2022-02-16 ENCOUNTER — VIRTUAL VISIT (OUTPATIENT)
Dept: SLEEP MEDICINE | Facility: CLINIC | Age: 37
End: 2022-02-16
Attending: FAMILY MEDICINE
Payer: COMMERCIAL

## 2022-02-16 ENCOUNTER — TELEPHONE (OUTPATIENT)
Dept: SLEEP MEDICINE | Facility: CLINIC | Age: 37
End: 2022-02-16

## 2022-02-16 VITALS — BODY MASS INDEX: 46.07 KG/M2 | WEIGHT: 276.5 LBS | HEIGHT: 65 IN

## 2022-02-16 DIAGNOSIS — G47.33 OSA (OBSTRUCTIVE SLEEP APNEA): Primary | ICD-10-CM

## 2022-02-16 DIAGNOSIS — G47.10 HYPERSOMNIA: ICD-10-CM

## 2022-02-16 PROCEDURE — 99214 OFFICE O/P EST MOD 30 MIN: CPT | Mod: 95 | Performed by: INTERNAL MEDICINE

## 2022-02-16 ASSESSMENT — SLEEP AND FATIGUE QUESTIONNAIRES
HOW LIKELY ARE YOU TO NOD OFF OR FALL ASLEEP WHEN YOU ARE A PASSENGER IN A CAR FOR AN HOUR WITHOUT A BREAK: HIGH CHANCE OF DOZING
HOW LIKELY ARE YOU TO NOD OFF OR FALL ASLEEP WHILE SITTING AND TALKING TO SOMEONE: MODERATE CHANCE OF DOZING
HOW LIKELY ARE YOU TO NOD OFF OR FALL ASLEEP WHILE SITTING AND READING: HIGH CHANCE OF DOZING
HOW LIKELY ARE YOU TO NOD OFF OR FALL ASLEEP WHILE WATCHING TV: MODERATE CHANCE OF DOZING
HOW LIKELY ARE YOU TO NOD OFF OR FALL ASLEEP IN A CAR, WHILE STOPPED FOR A FEW MINUTES IN TRAFFIC: MODERATE CHANCE OF DOZING
HOW LIKELY ARE YOU TO NOD OFF OR FALL ASLEEP WHILE LYING DOWN TO REST IN THE AFTERNOON WHEN CIRCUMSTANCES PERMIT: HIGH CHANCE OF DOZING
HOW LIKELY ARE YOU TO NOD OFF OR FALL ASLEEP WHILE SITTING QUIETLY AFTER LUNCH WITHOUT ALCOHOL: HIGH CHANCE OF DOZING
HOW LIKELY ARE YOU TO NOD OFF OR FALL ASLEEP WHILE SITTING INACTIVE IN A PUBLIC PLACE: HIGH CHANCE OF DOZING

## 2022-02-16 NOTE — PATIENT INSTRUCTIONS
Your BMI is Body mass index is 45.59 kg/m .    What is BMI?  Body mass index (BMI) is one way to tell whether you are at a healthy weight, overweight, or obese. It measures your weight in relation to your height.  A BMI of 18.5 to 24.9 is in the healthy range. A person with a BMI of 25 to 29.9 is considered overweight, and someone with a BMI of 30 or greater is considered obese.  Another way to find out if you are at risk for health problems caused by overweight and obesity is to measure your waist. If you are a woman and your waist is more than 35 inches, or if you are a man and your waist is more than 40 inches, your risk of disease may be higher.  More than two-thirds of American adults are considered overweight or obese. Being overweight or obese increases the risk for further weight gain.  Excess weight may lead to heart disease and diabetes. Creating and following plans for healthy eating and physical activity may help you improve your health.    Methods for maintaining or losing weight.  Weight control is part of healthy lifestyle and includes exercise, emotional health, and healthy eating habits.  Careful eating habits lifelong is the mainstay of weight control.  Though there are significant health benefits from weight loss, long-term weight loss with diet alone may be very difficult to achieve- studies show long-term success with dietary management in less than 10% of people. Attaining a healthy weight may be especially difficult to achieve in those with severe obesity. In some cases, medications, devices and surgical management might be considered.    What can you do?  If you are overweight or obese and are interested in methods for weight loss, you should discuss this with your provider. In addition, we recommend that you review healthy life styles and methods for weight loss available through the National Institutes of Health patient information sites:    http://win.niddk.nih.gov/publications/index.htm    Equipment Instructions    We will process your PAP order and send it to a Durable Medical Equipment (DME) provider.    The medical equipment company should call you within 7 days.  If you have not heard from the company, please contact them to see if they received your order and are planning to call you.    Please call us at [phone] if you are unable to contact the medical equipment company or if they do not have the order.    If you are starting a new PAP machine, please call us after you use it the first night to let us know how it went. This call also helps us know that you received your equipment and that everything is ready. Please use our central phone number [phone]    Contact information for [employer] company:    [facility] Tel: [phone]

## 2022-02-16 NOTE — PROGRESS NOTES
Jose is a 36 year old who is being evaluated via a billable video visit.      How would you like to obtain your AVS? Mail a copy  If the video visit is dropped, the invitation should be resent by: Text to cell phone: [phone]  Will anyone else be joining your video visit? No    Video Start Time: 11:25 AM  Video-Visit Details    Type of service:  Video Visit    Video End Time:11:41 AM    Originating Location (pt. Location): Home    Distant Location (provider location):  [facility]     Platform used for Video Visit: mobile melting [email or web address]     Additional 10 minutes on the date of service was spent performing the following:    -Preparing to see the patient  -Ordering medications, tests, or procedures   -Documenting clinical information in the electronic or other health record     Thank you for the opportunity to participate in the care of Jose Rapp.     He is a 36 year old y/o male patient who comes to the sleep medicine clinic for follow up.  The patient was diagnosed with MARCELINO on 08/18/14 (AHI=90.2). The patient states that his machine is not pumping out enough air and so his wife is now observing pauses in his breathing followed by loud snoring. He also has a mold problem in the humidification tank. He has since quit using CPAP because of the mold problem. The patient also admit that he is not getting enough sleep because his 7 children have different hours of sleep and he has to stay up to make sure his children are asleep. His wife was concerned that his daytime sleepiness may be symptomatic of narcolepsy.     Assessment and Plan:  In summary Jose Rapp is a 36 year old year old male who is here for follow up.    1. MARCELINO (obstructive sleep apnea)  I will order the patient to get a new CPAP machine. In the meanwhile I recommend that he bring in his machine to the DME to get interrogated.  - SLEEP EVALUATION & MANAGEMENT REFERRAL - ADULT -  - COMPREHENSIVE DME    2. Hypersomnia  I hesitate  to test the patient for narcolepsy at this time. I recommend that we 1st optimize his equipment care and have him use his machine consistently for 3 months and then reassess.  - COMPREHENSIVE DME     Not using CPAP for a few months.    Lab reviewed: Discussed with patient.    Sleep-Wake Cycle:    The patient likes to initiate sleep at around 10-11 PM with a sleep latency of variable length. The patient has multiple nocturnal awakenings. Final wake up time is around 7 AM.    NEHA:  NEHA Total Score: 15  Total score - [facility]: 21 (2/16/2022 11:06 AM)        Patient Active Problem List   Diagnosis     Meralgia Paresthetica On The Left     Obesity     Snoring (Symptom)     Morbid obesity (H)     MARCELINO (obstructive sleep apnea)       Past Medical History:   Diagnosis Date     Back pain      Obesity      Sleep apnea     uses CPAP       Past Surgical History:   Procedure Laterality Date     AMPUTATE FINGER(S) Left     lost very tip of finger in doorframe       Social History     Socioeconomic History     Marital status:      Spouse name: Not on file     Number of children: 5     Years of education: Not on file     Highest education level: Not on file   Occupational History     Not on file   Tobacco Use     Smoking status: Never Smoker     Smokeless tobacco: Never Used   Substance and Sexual Activity     Alcohol use: No     Drug use: No     Sexual activity: Yes     Partners: Female     Comment: open to pregnancy   Other Topics Concern     Not on file   Social History Narrative     Not on file     Social Determinants of Health     Financial Resource Strain: Not on file   Food Insecurity: Not on file   Transportation Needs: Not on file   Physical Activity: Not on file   Stress: Not on file   Social Connections: Not on file   Intimate Partner Violence: Not on file   Housing Stability: Not on file       Current Outpatient Medications   Medication Sig Dispense Refill     BACILLUS COAGULANS (PROBIOTIC, B. COAGULANS, ORAL)  [BACILLUS COAGULANS (PROBIOTIC, B. COAGULANS, ORAL)] Take by mouth. (Patient not taking: Reported on 12/14/2021)       CHROM JUNIOR/BRINDAL BERRY (GARCINIA CAMBOGIA ORAL) [CHROM JUNIOR/BRINDAL EPSTEIN (GARCINIA CAMBOGIA ORAL)] Take by mouth. (Patient not taking: Reported on 12/14/2021)       co-enzyme Q-10 30 mg capsule [CO-ENZYME Q-10 30 MG CAPSULE] Take 30 mg by mouth 3 (three) times a day. (Patient not taking: Reported on 12/14/2021)       fluticasone (FLONASE) 50 mcg/actuation nasal spray [FLUTICASONE (FLONASE) 50 MCG/ACTUATION NASAL SPRAY] 1 spray into each nostril daily. (Patient not taking: Reported on 2/16/2022) 16 g 2     glucosamine sulfate 500 mg cap [GLUCOSAMINE SULFATE 500 MG CAP] Take 500 mg by mouth 3 (three) times a day. (Patient not taking: Reported on 12/14/2021)       naproxen (NAPROSYN) 500 MG tablet [NAPROXEN (NAPROSYN) 500 MG TABLET] Take 1 tablet (500 mg total) by mouth 2 (two) times a day with meals. (Patient not taking: Reported on 2/16/2022) 30 tablet 0       No Known Allergies    Physical Exam:  GEN: NAD,  Psych: normal mood, normal affect    Labs/Studies:      No results found for: PH, PHARTERIAL, PO2, [id number], SAT, PCO2, HCO3, BASEEXCESS, MARCUS, BEB  No results found for: TSH  Lab Results   Component Value Date    GLC 98 12/14/2021     05/09/2019     Lab Results   Component Value Date    HGB 15.9 05/09/2019     Lab Results   Component Value Date    BUN 10 05/09/2019    CR 0.71 05/09/2019     Lab Results   Component Value Date    AST 31 05/09/2019    ALT 37 05/09/2019    ALKPHOS 58 05/09/2019    BILITOTAL 1.3 (H) 05/09/2019     No results found for: UAMP, UBARB, BENZODIAZEUR, UCANN, UCOC, OPIT, UPCP    Recent Labs   Lab Test 12/14/21  0855 05/09/19  0722   NA  --  139   POTASSIUM  --  4.3   CHLORIDE  --  107   CO2  --  19*   ANIONGAP  --  13   GLC 98 102   BUN  --  10   CR  --  0.71   IVONE  --  9.5       No results found for: CHIO    I reviewed the efficacy and compliance report from his  device. Data summarized on the HPI and the PAP compliance flow sheet.     Patient verbalized understanding of these issues, agrees with the plan and all questions were answered today. Patient was given an opportuntity to voice any other symptoms or concerns not listed above. Patient did not have any other symptoms or concerns.      Yang Garza DO  Board Certified in Internal Medicine and Sleep Medicine    (Note created with Dragon voice recognition and unintended spelling errors and word substitutions may occur)     Audio and visual devices were used for this virtual clinic visit with permission from patient.

## 2022-02-16 NOTE — TELEPHONE ENCOUNTER
CALLED AND SPOKE WITH PT REGARDING CPAP DEVICE. PT STATED THAT HE HAS MOLD IN HIS WATER CHAMBER GASKET. LET THE PT KNOW THAT THE WATER CHAMBER CAN BE REPLACED. PT ALSO STATED THAT HIS PROVIDER STATED HIS MACHINE NEEDS TO BE LOOKED AT BECAUSE IT IS NOT CORRECTING HIS SNORING. ASKED THE PT IF THE PROVIDER UPDATE THE PRESSURES AND HE STATED YES. WHEN CHECKING AIRVIEW THE PT IS NOT ENTERED INTO [facility] AIRVIEW ACCOUNT. PT IS WANTING TO SCHEDULE A TIME TO COME IN FOR US TO LOOK AT HIS DEVICE. LET THE PT KNOW WE HAVE A COUPLE OF OPTIONS. SEND IN THE DEVICE FOR REPAIRS AND OR BE PLACED ON THE WAIT LIST FOR A REPLACEMENT. PT IS WANTING TO BE PLACED ON THE WAIT LIST FOR A NEW DEVICE. PT IS ALSO WANTING TO SCHEDULE A TIME TO COME IN AND HAVE THE MACHINE CHECKED OUT AND  SUPPLIES AS WELL. PATIENT SCHEDULED AT THE [place] SHOWROOM 02/22/2021 10:30AM. CONFIRMED LOCATION, TIME AND DATE WITH THE PT

## 2022-02-18 NOTE — NURSING NOTE
DME order automatically route to [facility]. CPAP compliance instruction letter sent via [employer].         HUGO Pastor  [facility]

## 2022-02-22 DIAGNOSIS — G47.33 OBSTRUCTIVE SLEEP APNEA (ADULT) (PEDIATRIC): Primary | ICD-10-CM

## 2022-02-22 NOTE — PROGRESS NOTES
Patient came to [place]  for mask fitting appointment on February 22, 2022. Patient requested to switch masks because oral breathing. Discussed the following masks: [employer] Vitra full face; Resmed Airfit F30i full face; Respironics Dreamwear full face; Resmed Airfit F20 full face. Patient selected a [employer] Mask name: Vitera Full Face mask size Medium

## 2022-04-12 ENCOUNTER — DOCUMENTATION ONLY (OUTPATIENT)
Dept: SLEEP MEDICINE | Facility: CLINIC | Age: 37
End: 2022-04-12
Payer: COMMERCIAL

## 2022-04-12 NOTE — PROGRESS NOTES
Patient was offered choice of vendor and chose [facility].  Patient Jose Rapp was set up at [place]  on April 12, 2022. Patient received a Resmed Airsense 11 Pressures were set at 10-20 cm H2O.   Patient s ramp is 5 cm H2O for Auto and FLEX/EPR is EPR, 2.  Patient received a SmartCare system & [employer] Mask name: Vitera  Full Face mask size Medium, Standard, heated tubing and heated humidifier.  Patient does need to meet compliance. Patient has a follow up recommended but not required with Dr. Garza.    Tracy L Fahrenkamp

## 2022-04-15 ENCOUNTER — DOCUMENTATION ONLY (OUTPATIENT)
Dept: SLEEP MEDICINE | Facility: CLINIC | Age: 37
End: 2022-04-15
Payer: COMMERCIAL

## 2022-04-15 NOTE — PROGRESS NOTES
3 day Sleep therapy management telephone visit    Diagnostic AHI: 90.2  PSG    Confirmed with patient at time of call- No Patient is still interested in STM service       Subjective measures:  Patient lost part of his mask his kids hid the harness so he hasn't started using CPAP.        Objective data     Order Settings for PAP  CPAP min 10    CPAP max 20             Device settings from machine                           Assessment: Minimal usage      Action plan: Patient to have 14 day STM visit. Patient has a follow up visit scheduled:   Patient reminded to schedule follow up visit    Replacement device: No  STM ordered by provider: Yes     Total time spent on accessing and  interpreting remote patient PAP therapy data  10 minutes    Total time spent counseling, coaching  and reviewing PAP therapy data with patient  3 minutes    [id number] no

## 2022-09-24 ENCOUNTER — HEALTH MAINTENANCE LETTER (OUTPATIENT)
Age: 37
End: 2022-09-24

## 2022-11-19 ENCOUNTER — HOSPITAL ENCOUNTER (EMERGENCY)
Facility: CLINIC | Age: 37
Discharge: HOME | End: 2022-11-20
Attending: EMERGENCY MEDICINE | Admitting: EMERGENCY MEDICINE
Payer: COMMERCIAL

## 2022-11-19 ENCOUNTER — APPOINTMENT (OUTPATIENT)
Dept: CT IMAGING | Facility: CLINIC | Age: 37
End: 2022-11-19
Attending: EMERGENCY MEDICINE
Payer: COMMERCIAL

## 2022-11-19 ENCOUNTER — APPOINTMENT (OUTPATIENT)
Dept: GENERAL RADIOLOGY | Facility: CLINIC | Age: 37
End: 2022-11-19
Attending: EMERGENCY MEDICINE
Payer: COMMERCIAL

## 2022-11-19 DIAGNOSIS — S22.008A CLOSED FRACTURE OF SPINOUS PROCESS OF THORACIC VERTEBRA, INITIAL ENCOUNTER (H): ICD-10-CM

## 2022-11-19 DIAGNOSIS — S22.41XA CLOSED FRACTURE OF MULTIPLE RIBS OF RIGHT SIDE, INITIAL ENCOUNTER: ICD-10-CM

## 2022-11-19 DIAGNOSIS — S16.1XXA NECK STRAIN, INITIAL ENCOUNTER: ICD-10-CM

## 2022-11-19 LAB
CREAT BLD-MCNC: 0.6 MG/DL (ref 0.7–1.3)
GFR SERPL CREATININE-BSD FRML MDRD: >60 ML/MIN/1.73M2

## 2022-11-19 PROCEDURE — 99285 EMERGENCY DEPT VISIT HI MDM: CPT | Mod: 25

## 2022-11-19 PROCEDURE — 71275 CT ANGIOGRAPHY CHEST: CPT

## 2022-11-19 PROCEDURE — 71046 X-RAY EXAM CHEST 2 VIEWS: CPT

## 2022-11-19 PROCEDURE — 250N000009 HC RX 250: Performed by: EMERGENCY MEDICINE

## 2022-11-19 PROCEDURE — 82565 ASSAY OF CREATININE: CPT

## 2022-11-19 PROCEDURE — 72128 CT CHEST SPINE W/O DYE: CPT

## 2022-11-19 PROCEDURE — 250N000013 HC RX MED GY IP 250 OP 250 PS 637: Performed by: EMERGENCY MEDICINE

## 2022-11-19 PROCEDURE — 72125 CT NECK SPINE W/O DYE: CPT

## 2022-11-19 PROCEDURE — 250N000011 HC RX IP 250 OP 636: Performed by: EMERGENCY MEDICINE

## 2022-11-19 RX ORDER — IBUPROFEN 600 MG/1
600 TABLET, FILM COATED ORAL ONCE
Status: COMPLETED | OUTPATIENT
Start: 2022-11-19 | End: 2022-11-19

## 2022-11-19 RX ORDER — IOPAMIDOL 755 MG/ML
80 INJECTION, SOLUTION INTRAVASCULAR ONCE
Status: COMPLETED | OUTPATIENT
Start: 2022-11-19 | End: 2022-11-19

## 2022-11-19 RX ORDER — ACETAMINOPHEN 500 MG
1000 TABLET ORAL ONCE
Status: COMPLETED | OUTPATIENT
Start: 2022-11-19 | End: 2022-11-19

## 2022-11-19 RX ORDER — OXYCODONE HYDROCHLORIDE 5 MG/1
5 TABLET ORAL ONCE
Status: DISCONTINUED | OUTPATIENT
Start: 2022-11-19 | End: 2022-11-20 | Stop reason: HOSPADM

## 2022-11-19 RX ADMIN — IOPAMIDOL 80 ML: 755 INJECTION, SOLUTION INTRAVENOUS at 23:40

## 2022-11-19 RX ADMIN — ACETAMINOPHEN 1000 MG: 500 TABLET ORAL at 18:46

## 2022-11-19 RX ADMIN — IBUPROFEN 600 MG: 600 TABLET ORAL at 18:46

## 2022-11-19 RX ADMIN — SODIUM CHLORIDE 80 ML: 9 INJECTION, SOLUTION INTRAVENOUS at 23:40

## 2022-11-19 ASSESSMENT — ENCOUNTER SYMPTOMS
BACK PAIN: 1
ARTHRALGIAS: 1
ABDOMINAL PAIN: 0
NECK PAIN: 1
NAUSEA: 1
VOMITING: 0
HEADACHES: 0

## 2022-11-19 ASSESSMENT — ACTIVITIES OF DAILY LIVING (ADL): ADLS_ACUITY_SCORE: 35

## 2022-11-20 VITALS
BODY MASS INDEX: 44.98 KG/M2 | HEART RATE: 89 BPM | SYSTOLIC BLOOD PRESSURE: 163 MMHG | OXYGEN SATURATION: 93 % | RESPIRATION RATE: 22 BRPM | WEIGHT: 270 LBS | DIASTOLIC BLOOD PRESSURE: 69 MMHG | HEIGHT: 65 IN | TEMPERATURE: 96.9 F

## 2022-11-20 RX ORDER — OXYCODONE HYDROCHLORIDE 5 MG/1
5 TABLET ORAL EVERY 6 HOURS PRN
Qty: 12 TABLET | Refills: 0 | Status: SHIPPED | OUTPATIENT
Start: 2022-11-20 | End: 2022-11-23

## 2022-11-20 ASSESSMENT — ACTIVITIES OF DAILY LIVING (ADL): ADLS_ACUITY_SCORE: 35

## 2022-11-20 NOTE — ED PROVIDER NOTES
"  History   Chief Complaint:  Fall, Back Injury, and Neck Injury       HPI   Jose Rapp is a 37 year old male who presents after falling from 12 feet high when trying to put on philip lights on a ladder about 2 hours ago. He fell on his back and has mid and upper back pain. His friend mentions that he might have landed on the ladder. He has some neck pain and his right rib pain is making it hard to breath. He did feel nauseous after but no vomiting. He denies hitting his head. He denies abdominal pain or extremity pain. He denies coughing up blood. He denies past medical history.     Review of Systems   Gastrointestinal: Positive for nausea. Negative for abdominal pain and vomiting.   Musculoskeletal: Positive for arthralgias, back pain and neck pain.   Neurological: Negative for headaches.   All other systems reviewed and are negative.    Allergies:  The patient has no known allergies.     Medications:  Flonase  Naprosyn    Past Medical History:     Meralgia paresthetica on the left  Morbid obesity  MARCELINO    Past Surgical History:    Amputate fingers    Family History:    DM  Heart disease    Social History:  The patient presents to the ED with a friend  PCP: Lamar Nava    Physical Exam     Patient Vitals for the past 24 hrs:   BP Temp Temp src Pulse Resp SpO2 Height Weight   11/20/22 0025 (!) 163/69 -- -- -- -- 93 % -- --   11/20/22 0024 -- -- -- -- -- -- 1.651 m (5' 5\") 122.5 kg (270 lb)   11/19/22 1818 123/67 96.9  F (36.1  C) Temporal 89 22 91 % -- --     Physical Exam  SKIN:  Warm, dry.  HEMATOLOGIC/IMMUNOLOGIC/LYMPHATIC:  No pallor.  HENT: No facial or scalp trauma.  Minimal active range of motion of head and neck exacerbates posterior neck pain.  EYES:  Conjunctivae normal.  CARDIOVASCULAR:  Regular rate and rhythm.  No murmur.  RESPIRATORY:  No respiratory distress, breath sounds equal and normal.  Inspiration exacerbates the right-sided chest discomfort.  GASTROINTESTINAL:  Soft, nontender " abdomen.  MUSCULOSKELETAL: Overweight body habitus.  Range of motion of torso exacerbates upper back and right thoracic pain.  NEUROLOGIC:  Alert, conversant.  Oriented to self place and time.  No aphasia or dysarthria.  No tactile sensory or motor deficit of the face or limbs.  PSYCHIATRIC:  Normal mood.    Emergency Department Course     Imaging:  CTA Chest with Contrast   Final Result   IMPRESSION:   1.  Acute fractures of the posterior right fourth through seventh ribs. There is a small amount of adjacent extrapleural hemorrhage. No evidence of active bleeding. No pneumothorax.   2.  Acute fractures of the T6-T10 spinous processes.   3.  Mild left periadrenal fat stranding, which may reflect adrenal hemorrhage.   4.  Hepatic steatosis.   5.  Mild splenomegaly.   6.  Few, noncalcified pulmonary nodules, largest measuring 6.5 mm. Follow-up is recommended according to Fleischner criteria, as detailed below.      2017 Fleischner Society Recommendations for Solid Pulmonary Nodules      Nodule size greater than 6 mm up to 8 mm:      Single nodule:      Low risk: CT at 6-12 months, then consider CT at 18-24 months   High risk: CT at 6-12 months, then CT at 18-24 months      Multiple nodules:      Low risk: CT at 3-6 months, then consider CT at 18-24 months   High risk: CT at 3-6 months, then CT at 18-24 months      CT Thoracic Spine w/o Contrast   Final Result   IMPRESSION:   CERVICAL SPINE CT:   1.  No fracture or posttraumatic subluxation.   2.  No high-grade spinal canal or neural foraminal stenosis.      THORACIC SPINE CT:   1.  Acute mildly displaced fractures involving the spinous processes of T6, T7, T8, T9 and T10.   2.  Acute and incompletely imaged right posterior fourth rib fracture. There is a probable small amount of adjacent pleural fluid, likely reflecting hemorrhage. Consider dedicated CT chest for further assessment, if not already performed.   3.  Additional findings, as above.                 Simone Aleman discussed results with Dr. PROCTOR on 11/19/2022 11:05 PM.      Cervical spine CT w/o contrast   Final Result   IMPRESSION:   CERVICAL SPINE CT:   1.  No fracture or posttraumatic subluxation.   2.  No high-grade spinal canal or neural foraminal stenosis.      THORACIC SPINE CT:   1.  Acute mildly displaced fractures involving the spinous processes of T6, T7, T8, T9 and T10.   2.  Acute and incompletely imaged right posterior fourth rib fracture. There is a probable small amount of adjacent pleural fluid, likely reflecting hemorrhage. Consider dedicated CT chest for further assessment, if not already performed.   3.  Additional findings, as above.               Dr. Simone Aleman discussed results with Dr. PROCTOR on 11/19/2022 11:05 PM.      Chest XR,  PA & LAT   Final Result   IMPRESSION: Mild bibasilar atelectasis. No pleural effusion or pneumothorax. The cardiac silhouette is at the upper limits of normal for size. Normal pulmonary vascularity. Acute minimally displaced posterior right fourth through sixth rib fractures.        Report per radiology    Laboratory:  Labs Ordered and Resulted from Time of ED Arrival to Time of ED Departure   ISTAT CREATININE POCT - Abnormal       Result Value    Creatinine POCT 0.6 (*)     GFR, ESTIMATED POCT >60     ISTAT CREATININE POCT      Emergency Department Course:     Reviewed:  I reviewed nursing notes, vitals, past medical history and Care Everywhere    Assessments:  2207 I obtained history and examined the patient as noted above. I explained findings.     2310 I rechecked the patient and explained findings.     0034 I rechecked the patient and explained findings.     Consults:  2304 I spoke with the radiologist    Interventions:  1846 Tylenol 1,000 mg PO    1846 ibuprofen 600 mg PO    Disposition:  The patient was discharged to home.     Impression & Plan     Medical Decision Making:  This patient presents after a fall off a ladder.  An approximate 12 foot  height/fall.  He denies head injury.  Complaining of neck as well as mid back pain.  Denied low back pain.  Complained of right-sided chest pain as well.  Initial imaging performed of the affected areas including chest x-ray as well as CT scan cervical and thoracic spine.  Chest x-ray did reveal several rib fractures.  Cervical spine without injury/fracture.  Thoracic spine imaging did reveal multiple spinous process fractures.  No fracture of the vertebral bodies.  Interpreting radiologist thought there was some degree of pulmonary bleeding evidence on the lung windows visible on the thoracic spine CT scan.  This prompted a CT scan chest to assess further.  There was no finding of distant pulmonary hemorrhage but there is extrapleural blood albeit a small amount.  Likely secondary to the rib fractures and his trauma.  Patient is doing well from a respiratory standpoint.  Improved with treatment.  Did not think he required to be admitted.  Nor did he.  Advised spine clinic follow-up for recheck regarding thoracic spinous fractures in addition to conservative management of the rib fractures.  I prescribed oxycodone for pain and otherwise advised ibuprofen and Tylenol.    Diagnosis:    ICD-10-CM    1. Closed fracture of multiple ribs of right side, initial encounter  S22.41XA       2. Closed fracture of spinous process of thoracic vertebra, initial encounter (H)  S22.008A       3. Neck strain, initial encounter  S16.1XXA           Discharge Medications:  New Prescriptions    OXYCODONE (ROXICODONE) 5 MG TABLET    Take 1 tablet (5 mg) by mouth every 6 hours as needed for pain       Scribe Disclosure:  ARSENIO, Mirna Farfan, am serving as a scribe at 10:06 PM on 11/19/2022 to document services personally performed by Duong Ortega MD based on my observations and the provider's statements to me.          Duong Ortega MD  11/20/22 0052       Duong Ortega MD  11/20/22 0052

## 2022-11-20 NOTE — ED NOTES
Rapid Assessment Note    History:   Jose Rapp is a 37 year old male who presents after falling from 12 feet high when trying to put on philip lights on a ladder about 2 hours ago. He fell on his back and has mid and upper back pain. He has some neck pain and his right rib pain is making it hard to breath. He did feel nauseous after but no vomiting. He denies hitting his head. He denies abdominal pain or extremity pain. He denies coughing up blood. He denies past medical history.     Exam:   /67   Pulse 89   Temp 96.9  F (36.1  C) (Temporal)   Resp 22   SpO2 91%   SKIN:  Warm, dry.  HEMATOLOGIC/IMMUNOLOGIC/LYMPHATIC:  No pallor.  HENT: No facial or scalp trauma.  Minimal range of motion of head and neck exacerbates neck pain.  EYES:  Conjunctivae normal.  CARDIOVASCULAR:  Regular rate and rhythm.  No murmur.  RESPIRATORY:  No respiratory distress, breath sounds equal and normal.  Inspiration exacerbates right thoracic pain.  GASTROINTESTINAL:  Soft, nontender abdomen.  MUSCULOSKELETAL: Overweight body habitus.  Range of motion of torso exacerbates mid back pain.  NEUROLOGIC:  Alert, conversant.  Oriented to self place and time.  No gross tactile sensory or motor deficit of the extremities.  PSYCHIATRIC:  Normal mood.    Plan of Care:   I evaluated the patient and developed an initial plan of care. I discussed this plan and explained that I, or one of my partners, would be returning to complete the evaluation.         I, Mirna Hdezmanabhan, am serving as a scribe to document services personally performed by Duong Ortega MD, based on my observations and the provider's statements to me.    11/19/2022  [employer]    Portions of this medical record were completed by a scribe. UPON MY REVIEW AND AUTHENTICATION BY ELECTRONIC SIGNATURE, this confirms (a) I performed the applicable clinical services, and (b) the record is accurate.        Duong Ortega MD  11/19/22  1825

## 2022-11-20 NOTE — ED TRIAGE NOTES
Patient fell from roof when putting up O[employer] lights. Was climbing and got to the top of the ladder, around 12-15ft up, and then fell backwards onto his back. Reports mid-to-lower back pain, some neck pain, is tachypnic.      Triage Assessment     Row Name 11/19/22 5562       Triage Assessment (Adult)    Airway WDL WDL       Respiratory WDL    Respiratory WDL X;rhythm/pattern    Rhythm/Pattern, Respiratory tachypneic       Cardiac WDL    Cardiac WDL WDL       Cognitive/Neuro/Behavioral WDL    Cognitive/Neuro/Behavioral WDL WDL

## 2022-11-22 ENCOUNTER — TRANSFERRED RECORDS (OUTPATIENT)
Dept: HEALTH INFORMATION MANAGEMENT | Facility: CLINIC | Age: 37
End: 2022-11-22

## 2023-01-09 ENCOUNTER — TELEPHONE (OUTPATIENT)
Dept: SLEEP MEDICINE | Facility: CLINIC | Age: 38
End: 2023-01-09
Payer: COMMERCIAL

## 2023-01-09 NOTE — TELEPHONE ENCOUNTER
Reason for Call:  Other order    Detailed comments: patients wife Teofilo called and states that patient needs [facility]    No orders in chart.    Please contact wife (if consent).  Thank you.    Phone Number Patient can be reached at: Home number on file [phone] (home)    Best Time: any    Can we leave a detailed message on this number? NO    Call taken on 1/9/2023 at 3:10 PM by Tiana Means

## 2023-01-17 NOTE — TELEPHONE ENCOUNTER
2/16/22  1. MARCELINO (obstructive sleep apnea)  I will order the patient to get a new CPAP machine. In the meanwhile I recommend that he bring in his machine to the DME to get interrogated.  - SLEEP EVALUATION & MANAGEMENT REFERRAL - ADULT -  - COMPREHENSIVE DME     2. Hypersomnia  I hesitate to test the patient for narcolepsy at this time. I recommend that we 1st optimize his equipment care and have him use his machine consistently for 3 months and then reassess.  - COMPREHENSIVE DME      Left detailed message for wife. Consent to communicate on file. Advised Dr Garza wanted to see data from 3 months of consistent use of CPAP. Writer requested follow up visit to be scheduled with Dr Garza and scheduling number provided.     Isabella ROMO RN  [facility]

## 2023-01-19 ENCOUNTER — TELEPHONE (OUTPATIENT)
Dept: FAMILY MEDICINE | Facility: CLINIC | Age: 38
End: 2023-01-19
Payer: COMMERCIAL

## 2023-01-19 DIAGNOSIS — G47.33 OBSTRUCTIVE SLEEP APNEA: Primary | ICD-10-CM

## 2023-01-19 NOTE — TELEPHONE ENCOUNTER
Reason for Call:  Other call back    Detailed comments: Wife called and was wondering if just ordering a new cpap gets rid of the bill they currently have? They were instructed that the only way to get rid of their $1500 bill was to have a sleep study done. Pt's wife would like a call back from someone to discuss this    Phone Number Patient can be reached at: Home number on file [phone] (home)    Best Time: anytime    Can we leave a detailed message on this number? YES    Call taken on 1/19/2023 at 9:57 AM by Sherine Pereyra

## 2023-01-25 NOTE — TELEPHONE ENCOUNTER
General Call    Contacts       Type Contact Phone/Fax    01/19/2023 09:56 AM CST Phone (Incoming) Teofilo Rapp (Emergency Contact) [phone]    01/25/2023 10:39 AM CST Phone (Incoming) Teofilo Rapp (Emergency Contact) [phone]        Reason for Call:  Discuss bill issues and next steps needed    What are your questions or concerns:  Wife calling back again (CTC on file) to see were things are at regarding large bill they received and if a new ofv is needed.     Date of last appointment with provider: 02/16/2022    Could we send this information to you in [employer] or would you prefer to receive a phone call?:   Patient would prefer a phone call   Okay to leave a detailed message?: Yes at Other phone number:  [phone]

## 2023-01-25 NOTE — TELEPHONE ENCOUNTER
"Note copied from different encounter about evolution of patients situation.  Needs new study because he was out of compliance and the only way to get a new one is a new sleep study.   \"Spoke with Olindachristiano, patients wife and emergency contact and spoke to JAMALREINA.  [facility] of [facility].  [facility] informed patient multiple times via letters per they're report that he needed to return the machine because of non-compliance.  Per patients wife they have a difficult time keeping him in bed at night to wear it due to having kids in they're bed.  She states he needs it and wears it when he can and cannot return it until they have another one.  They plan on keeping the machine until they can get a new one.  Patient would need a new sleep study and then order for a new CPAP to get another one.  His AHI was 90.2 at his sleep study in 2014.  Patient wants sleep study as soon as possible.     Louie SYKES RN  [facility]    "

## 2023-01-25 NOTE — TELEPHONE ENCOUNTER
Spoke with Teofilo, patients wife and emergency contact and [facility]  [facility] compliance in August of 2022.  [facility] informed patient multiple times via letters per they're report that he needed to return the machine because of non-compliance.  Per patients wife they have a difficult time keeping him in bed at night to wear it due to having kids in they're bed.  She states he needs it and wears it when he can and cannot return it until they have another one.  They plan on keeping the machine until they can get a new one.  Patient would need a new sleep study and then order for a new CPAP to get another one.  His AHI was 90.2 at his sleep study in 2014.  Patient wants sleep study as soon as possible.

## 2023-01-29 ENCOUNTER — HEALTH MAINTENANCE LETTER (OUTPATIENT)
Age: 38
End: 2023-01-29

## 2023-04-06 ENCOUNTER — THERAPY VISIT (OUTPATIENT)
Dept: SLEEP MEDICINE | Facility: CLINIC | Age: 38
End: 2023-04-06
Payer: COMMERCIAL

## 2023-04-06 DIAGNOSIS — G47.33 OBSTRUCTIVE SLEEP APNEA: ICD-10-CM

## 2023-04-06 PROCEDURE — 95811 POLYSOM 6/>YRS CPAP 4/> PARM: CPT | Performed by: INTERNAL MEDICINE

## 2023-04-07 NOTE — PROGRESS NOTES
Completed a split night PSG per provider order.    Preliminary .5.  A final therapeutic PAP pressure was achieved.    Supine REM was seen on therapeutic pressure.    Patient reports feeling refreshed in AM.

## 2023-04-10 LAB — SLPCOMP: NORMAL

## 2023-05-11 ASSESSMENT — SLEEP AND FATIGUE QUESTIONNAIRES
HOW LIKELY ARE YOU TO NOD OFF OR FALL ASLEEP IN A CAR, WHILE STOPPED FOR A FEW MINUTES IN TRAFFIC: MODERATE CHANCE OF DOZING
HOW LIKELY ARE YOU TO NOD OFF OR FALL ASLEEP WHILE SITTING INACTIVE IN A PUBLIC PLACE: HIGH CHANCE OF DOZING
HOW LIKELY ARE YOU TO NOD OFF OR FALL ASLEEP WHILE SITTING AND TALKING TO SOMEONE: SLIGHT CHANCE OF DOZING
HOW LIKELY ARE YOU TO NOD OFF OR FALL ASLEEP WHILE WATCHING TV: MODERATE CHANCE OF DOZING
HOW LIKELY ARE YOU TO NOD OFF OR FALL ASLEEP WHILE SITTING QUIETLY AFTER LUNCH WITHOUT ALCOHOL: HIGH CHANCE OF DOZING
HOW LIKELY ARE YOU TO NOD OFF OR FALL ASLEEP WHILE LYING DOWN TO REST IN THE AFTERNOON WHEN CIRCUMSTANCES PERMIT: SLIGHT CHANCE OF DOZING
HOW LIKELY ARE YOU TO NOD OFF OR FALL ASLEEP WHEN YOU ARE A PASSENGER IN A CAR FOR AN HOUR WITHOUT A BREAK: HIGH CHANCE OF DOZING
HOW LIKELY ARE YOU TO NOD OFF OR FALL ASLEEP WHILE SITTING AND READING: HIGH CHANCE OF DOZING

## 2023-05-12 ENCOUNTER — VIRTUAL VISIT (OUTPATIENT)
Dept: SLEEP MEDICINE | Facility: CLINIC | Age: 38
End: 2023-05-12
Payer: COMMERCIAL

## 2023-05-12 VITALS — WEIGHT: 280 LBS | BODY MASS INDEX: 45 KG/M2 | HEIGHT: 66 IN

## 2023-05-12 DIAGNOSIS — G47.34 SLEEP RELATED HYPOXIA: ICD-10-CM

## 2023-05-12 DIAGNOSIS — G47.33 OBSTRUCTIVE SLEEP APNEA: Primary | ICD-10-CM

## 2023-05-12 PROCEDURE — 99213 OFFICE O/P EST LOW 20 MIN: CPT | Mod: VID | Performed by: INTERNAL MEDICINE

## 2023-05-12 ASSESSMENT — PAIN SCALES - GENERAL: PAINLEVEL: SEVERE PAIN (6)

## 2023-05-12 NOTE — PROGRESS NOTES
Virtual Visit Details    Type of service:  Video Visit   Video Start Time: 7:57 AM  Video End Time:8:10 AM  Originating Location (pt. Location): Home  Distant Location (provider location):  Off-site  Platform used for Video Visit: AmWell     Additional 10 minutes on the date of service was spent performing the following:    -Preparing to see the patient (eg, review of tests)   -Ordering medications, tests, or procedures   -Documenting clinical information in the electronic or other health record     Thank you for the opportunity to participate in the care of Jose Rapp.     He is a 37 year old  male patient who comes to the sleep medicine clinic for review of sleep study results. The study was completed on 04/06/2023  which showed that the patient had severe obstructive sleep apnea with an apnea hypopnea index of 112.6 events per hour with the lowest O2 Sat of 47%.    Assessment and Plan:  In summary Jose Rapp is a 37 year old year old male here for review of sleep study results.  1. Obstructive Sleep Apnea/Sleep related hypoxia  We had an extensive conversation to review the results of his sleep study and to  him on the importance of treating sleep apnea.  Patient decided to proceed with CPAP again. He will start using the device as soon as he receives it with the intention to use if for the entire night. We discussed some tips to increase PAP tolerance as well as the normal curve of adaptation. CPAP is going to provide improved respiratory function during the night but it can cause some sleep disruption that tends to improve with continuous usage. He should return to the clinic in 7 weeks to review compliance and efficacy monitoring. Since the patient does not have any preference, we will use [employer] as the [employer] medical equipment company.     NEHA:  NEHA Total Score: 17  Total score - [place]: 18 (5/11/2023 10:24 AM)    Patient Active Problem List   Diagnosis     Meralgia Paresthetica On  The Left     Obesity     Snoring (Symptom)     Morbid obesity (H)     MARCELINO (obstructive sleep apnea)       Current Outpatient Medications   Medication Sig Dispense Refill     naproxen (NAPROSYN) 500 MG tablet [NAPROXEN (NAPROSYN) 500 MG TABLET] Take 1 tablet (500 mg total) by mouth 2 (two) times a day with meals. 30 tablet 0       No Known Allergies    Visual Exam:  GEN: NAD  Psych: normal mood, normal affect     Labs/Studies:  - We reviewed the results of the overnight study as described on the HPI.       Patient verbalized understanding of these issues, agrees with the plan and all questions were answered today. Patient was given an opportuntity to voice any other symptoms or concerns not listed above. Patient did not have any other symptoms or concerns.      Yang Garza DO  Board Certified in Internal Medicine and Sleep Medicine      (Note created with Dragon voice recognition and unintended spelling errors and word substitutions may occur)

## 2023-05-12 NOTE — NURSING NOTE
Is the patient currently in the state of [place]? YES    Visit mode:VIDEO    If the visit is dropped, the patient can be reconnected by: VIDEO VISIT: Text to cell phone: [phone]    Will anyone else be joining the visit? NO      How would you like to obtain your AVS? MyChart    Are changes needed to the allergy or medication list? NO    Reason for visit: sleep study follow up     Has patient had flu shot for current/most recent flu season? If so, when? No

## 2023-05-12 NOTE — PATIENT INSTRUCTIONS
Equipment Instructions    We will process your PAP order and send it to a Durable Medical Equipment (DME) provider.    The medical equipment company should call you within 7 days.  If you have not heard from the company, please contact them to see if they received your order and are planning to call you.    Please call us at [phone] if you are unable to contact the medical equipment company or if they do not have the order.    If you are starting a new PAP machine, please call us after you use it the first night to let us know how it went. This call also helps us know that you received your equipment and that everything is ready. Please use our central phone number [phone]    Contact information for [employer] company:    [employer] [facility] Tel: [phone]

## 2023-05-23 ENCOUNTER — TRANSFERRED RECORDS (OUTPATIENT)
Dept: HEALTH INFORMATION MANAGEMENT | Facility: CLINIC | Age: 38
End: 2023-05-23
Payer: COMMERCIAL

## 2023-07-03 ENCOUNTER — OFFICE VISIT (OUTPATIENT)
Dept: FAMILY MEDICINE | Facility: CLINIC | Age: 38
End: 2023-07-03
Payer: COMMERCIAL

## 2023-07-03 VITALS
HEART RATE: 77 BPM | SYSTOLIC BLOOD PRESSURE: 147 MMHG | DIASTOLIC BLOOD PRESSURE: 94 MMHG | RESPIRATION RATE: 18 BRPM | OXYGEN SATURATION: 97 % | TEMPERATURE: 97.8 F

## 2023-07-03 DIAGNOSIS — L03.116 CELLULITIS OF LEFT LOWER EXTREMITY: Primary | ICD-10-CM

## 2023-07-03 PROCEDURE — 99213 OFFICE O/P EST LOW 20 MIN: CPT | Performed by: STUDENT IN AN ORGANIZED HEALTH CARE EDUCATION/TRAINING PROGRAM

## 2023-07-03 RX ORDER — CEFDINIR 300 MG/1
300 CAPSULE ORAL 2 TIMES DAILY
Qty: 14 CAPSULE | Refills: 0 | Status: SHIPPED | OUTPATIENT
Start: 2023-07-03 | End: 2023-07-10

## 2023-07-03 NOTE — PROGRESS NOTES
Assessment & Plan     Cellulitis of left lower extremity  Wells DVT criteria 0, low suspicion for thrombus.  Likely early developing cellulitis of the left lower leg, will treat with a 7-day course of cefdinir.  Continue to ice, discussed with him return precautions if worsening swelling, pain, redness, fever despite antibiotics.  - cefdinir (OMNICEF) 300 MG capsule; Take 1 capsule (300 mg) by mouth 2 times daily for 7 days    Return if symptoms worsen or fail to improve.    Latrell Miller MD  [facility]    Renee Garcia is a 37 year old, presenting for the following health issues:  Leg Problem (Possible infection of left lower  leg very red and painful, feels tight, going on for 5)        12/14/2021     7:43 AM   Additional Questions   Roomed by lisa crabtree cma     Patient Active Problem List   Diagnosis     Meralgia Paresthetica On The Left     Obesity     Snoring (Symptom)     Morbid obesity (H)     MARCELINO (obstructive sleep apnea)     Current Outpatient Medications   Medication     cefdinir (OMNICEF) 300 MG capsule     naproxen (NAPROSYN) 500 MG tablet     No current facility-administered medications for this visit.       HPI     Patient presents with 5 days of swelling, redness, pain and tightness over the right anterior shin.  Notes symptoms possibly started after hitting his shin during work, has also been camping and they have possibly been bug bites as well.  He denies any obvious tick bites.  Denies any calf pain.  The first night when this happened noted some intermittent fevers, chills but nothing since.  Has been icing his leg with some benefit.    Review of Systems   Constitutional, HEENT, cardiovascular, pulmonary, gi and gu systems are negative, except as otherwise noted.      Objective    BP (!) 147/94   Pulse 77   Temp 97.8  F (36.6  C) (Oral)   Resp 18   SpO2 97%   There is no height or weight on file to calculate BMI.  Physical Exam   GENERAL: healthy,  alert and no distress  EYES: Eyes grossly normal to inspection, PERRL and conjunctivae and sclerae normal  MS: extremities normal- no gross deformities noted.  Negative Jaime's.  SKIN: Mild tenderness to palpation, erythema over the right anterior shin.  NEURO: Normal strength and tone, mentation intact and speech normal  PSYCH: mentation appears normal, affect normal/bright

## 2023-07-19 ENCOUNTER — ANCILLARY PROCEDURE (OUTPATIENT)
Dept: GENERAL RADIOLOGY | Facility: CLINIC | Age: 38
End: 2023-07-19
Attending: FAMILY MEDICINE
Payer: COMMERCIAL

## 2023-07-19 ENCOUNTER — OFFICE VISIT (OUTPATIENT)
Dept: FAMILY MEDICINE | Facility: CLINIC | Age: 38
End: 2023-07-19
Payer: COMMERCIAL

## 2023-07-19 VITALS
SYSTOLIC BLOOD PRESSURE: 145 MMHG | TEMPERATURE: 98.1 F | RESPIRATION RATE: 18 BRPM | BODY MASS INDEX: 45.03 KG/M2 | OXYGEN SATURATION: 95 % | HEART RATE: 82 BPM | WEIGHT: 279 LBS | DIASTOLIC BLOOD PRESSURE: 82 MMHG

## 2023-07-19 DIAGNOSIS — S89.92XS: Primary | ICD-10-CM

## 2023-07-19 DIAGNOSIS — S89.92XS: ICD-10-CM

## 2023-07-19 PROCEDURE — 99213 OFFICE O/P EST LOW 20 MIN: CPT | Performed by: FAMILY MEDICINE

## 2023-07-19 PROCEDURE — 73590 X-RAY EXAM OF LOWER LEG: CPT | Mod: TC | Performed by: RADIOLOGY

## 2023-07-20 NOTE — PROGRESS NOTES
Assessment/Plan:   Shin injury, left, sequela  Persistent shin pain following blunt trauma and subsequent cellulitis. There is no further infection and the wound is healing well albeit slowly. Due to pain with weight bearing we did elect to obtain a tibfib film to rule out hairline fracture or osteomyelitis. That was obtained and viewed by me showing no fracture or bony abnormality.   I suspect resolving hematoma and anterior shin wound which will continue to heal.   Reviewed wound care, suggested elevation of feet and consider compression socks. Recheck as needed.    - XR Tibia and Fibula Left 2 Views; Future    I discussed red flag symptoms, return precautions to clinic/ER and follow up care with patient/parent.  Expected clinical course, symptomatic cares advised. Questions answered. Patient/parent amenable with plan.      Subjective:     Jose Rapp is a 37 year old male who presents for recheck of some redness and swelling left lower leg. He banged the shin at work at the end of June and then went camping where he sustained bug bites on both legs. The left shin became increasingly warm and red and tight and painful and he was seen here on 7/3/23 after 5 days of symptoms and treated for cellulitis. He had had one day of fever/chills at the onset of the redness. No calf pain or signs to suggest DVT at that itme.   He reports that the central bump broke open at one point since then has not yet closed.   There is less redness but it is not gone and it remains a bit tender. Few scabs.   Antibiotics are finished.   He tends to have mild swelling in feet and lower legs at times, not worse now, improved from previously.   No calf pain. Shin is . He is concerned about a lump on the anterior shin and pain with weight bearing  No shortness of breath or chest pain     No Known Allergies    Current Outpatient Medications   Medication    naproxen (NAPROSYN) 500 MG tablet     No current facility-administered  medications for this visit.     Patient Active Problem List   Diagnosis    Meralgia Paresthetica On The Left    Obesity    Snoring (Symptom)    Morbid obesity (H)    MARCELINO (obstructive sleep apnea)       Objective:     BP (!) 145/82 (BP Location: Right arm, Patient Position: Sitting, Cuff Size: Adult Large)   Pulse 82   Temp 98.1  F (36.7  C) (Oral)   Resp 18   Wt 126.6 kg (279 lb)   SpO2 95%   BMI 45.03 kg/m      Physical    General Appearance: Alert, pleasant, no distress, AVSS  Head: Normocephalic, without obvious abnormality, atraumatic  Eyes: Conjunctivae are normal.   Lungs: respirations unlabored  Extremities: trace bilateral L>R lower extremity edema, palpable pedal pulses both feet, normal cap refill. Sensation normal. There are venous stasis and post inflammatory skin changes lower legs L>R - brownish more than red now, no warmth or induration. There is a shallow abrasion over the anterior shin which is all that is left of the initial wound. Few scabs on both lower legs. No purulent drainage or cellulitis apparent. Tender to palpate the bony shin and there is swelling over the anterior shin, I suspect a resolving hematoma but quite tender.   Skin: as above, no other rashes or lesions  Psychiatric: Patient has a normal mood and affect.       Results for orders placed or performed in visit on 07/19/23   XR Tibia and Fibula Left 2 Views     Status: None    Narrative    EXAM: XR TIBIA AND FIBULA LEFT 2 VIEWS  LOCATION: [facility]  DATE: 7/19/2023    INDICATION: struck shin a few weeks ago, treated for cellulitis which is better, still very painful on shin, rule out osteo or hairline fracture  COMPARISON: None.      Impression    IMPRESSION: The left tibia and fibula are negative.       This note has been dictated in part using voice recognition software.  Any grammatical or context distortions are unintentional and inherent to the software.  Please feel free to contact me directly  for clarification if needed.

## 2023-09-22 ENCOUNTER — TELEPHONE (OUTPATIENT)
Dept: VASCULAR SURGERY | Facility: CLINIC | Age: 38
End: 2023-09-22

## 2023-09-22 NOTE — TELEPHONE ENCOUNTER
Pt called at 1337, stating he may be a few minutes late. Pt is aiming to be there at the appt time, NOT the arrival time, due to transportation. Please CALL pt, as soon as possible, if this appt needs to be rescheduled. Thank you.

## 2023-10-04 ENCOUNTER — OFFICE VISIT (OUTPATIENT)
Dept: PODIATRY | Facility: CLINIC | Age: 38
End: 2023-10-04
Payer: COMMERCIAL

## 2023-10-04 VITALS — WEIGHT: 270 LBS | HEIGHT: 66 IN | RESPIRATION RATE: 20 BRPM | BODY MASS INDEX: 43.39 KG/M2 | HEART RATE: 68 BPM

## 2023-10-04 DIAGNOSIS — B35.3 TINEA PEDIS OF BOTH FEET: Primary | ICD-10-CM

## 2023-10-04 DIAGNOSIS — R60.0 EDEMA OF BOTH FEET: ICD-10-CM

## 2023-10-04 PROCEDURE — 99213 OFFICE O/P EST LOW 20 MIN: CPT | Performed by: PODIATRIST

## 2023-10-04 RX ORDER — CLOTRIMAZOLE AND BETAMETHASONE DIPROPIONATE 10; .64 MG/G; MG/G
CREAM TOPICAL 2 TIMES DAILY
Qty: 45 G | Refills: 0 | Status: SHIPPED | OUTPATIENT
Start: 2023-10-04 | End: 2023-11-03

## 2023-10-04 ASSESSMENT — PAIN SCALES - GENERAL: PAINLEVEL: MILD PAIN (2)

## 2023-10-04 NOTE — PATIENT INSTRUCTIONS
Office Locations    [facility]  [address] [place] [address]  Home Medical Equipment, [address]   Phone: [phone]   Orthotics and Prosthetics, [address]   Phone: [phone]    [facility]   Home Medical Equipment   [address], [place] [address]  Phone :[phone]  Orthotics and Prosthetics   [address], [address], [facility] [address]  Phone:[phone]          [facility]  [address]   [place] [address]   Phone: [phone]    [facility] Bld[facility]  [address]  [place] [address]  Phone: [phone]    [facility]   [address]  [place] [address]  Phone: [phone]    [facility]  [address]  [place] [address]  Phone: [phone]    [facility]  [address]  [address]  [place] [address]  Phone: [phone]    [place]   [address] [place] [address]   Phone: [phone]

## 2023-10-04 NOTE — PROGRESS NOTES
FOOT AND ANKLE SURGERY/PODIATRY Progress Note        ASSESSMENT:   Tinea pedis  Edema both feet    HPI: Jose Rapp was seen again today presented to the clinic today complaining that his work boots are aggravating his feet.  He states that he tries very hard to make sure that the boots the right size and width.  After wearing his boots for several hours he has severe pain involving the fifth toe both feet.  He only has this pain while wearing his work boots.  It can be moderate to severe.  He has modified his boots to prevent irritation to his feet.  He stated that he does have some mild swelling of his feet.  He also mentioned that he has some itching on top of both feet as well.  He finds himself scratching his skin on the top of both feet in response to the amount of itching that has occurred.    Past Medical History:   Diagnosis Date    Back pain     Obesity     Sleep apnea     uses CPAP        Past Surgical History:   Procedure Laterality Date    AMPUTATE FINGER(S) Left     lost very tip of finger in doorframe       No Known Allergies      Current Outpatient Medications:     clotrimazole-betamethasone (LOTRISONE) 1-0.05 % external cream, Apply topically 2 times daily, Disp: 45 g, Rfl: 0    naproxen (NAPROSYN) 500 MG tablet, [NAPROXEN (NAPROSYN) 500 MG TABLET] Take 1 tablet (500 mg total) by mouth 2 (two) times a day with meals., Disp: 30 tablet, Rfl: 0    Family History   Adopted: Yes   Problem Relation Age of Onset    Diabetes Father     Heart Disease Father        Social History     Socioeconomic History    Marital status:      Spouse name: Not on file    Number of children: 5    Years of education: Not on file    Highest education level: Not on file   Occupational History    Not on file   Tobacco Use    Smoking status: Never    Smokeless tobacco: Never   Substance and Sexual Activity    Alcohol use: No    Drug use: No    Sexual activity: Yes     Partners: Female     Comment: open to pregnancy  "  Other Topics Concern    Not on file   Social History Narrative    Not on file     Social Determinants of Health     Financial Resource Strain: Not on file   Food Insecurity: Not on file   Transportation Needs: Not on file   Physical Activity: Not on file   Stress: Not on file   Social Connections: Not on file   Interpersonal Safety: Not on file   Housing Stability: Not on file       10 point Review of Systems is negative .     Pulse 68   Resp 20   Ht 1.676 m (5' 6\")   Wt 122.5 kg (270 lb)   BMI 43.58 kg/m      BMI= Body mass index is 43.58 kg/m .    OBJECTIVE:  General appearance: Patient is alert and fully cooperative with history & exam.  No sign of distress is noted during the visit.  Vascular: Dorsalis pedis and posterior tibial pulses are palpable. There is pedal hair growth bilaterally.  CFT < 3 sec from anterior tibial surface to distal digits bilaterally. There is no moderate edema noted bilateral feet.  Dermatologic: Round, scaly, circular erythematous lesions dorsal aspect both feet.  Turgor and texture are within normal limits. No coloration or temperature changes. No other primary or secondary lesions noted.  Neurologic: All epicritic and proprioceptive sensations are grossly intact bilaterally.  Musculoskeletal: All active and passive ankle, subtalar, midtarsal, and 1st MPJ range of motion are grossly intact without pain or crepitus, with the exception of none. Manual muscle strength is within normal limits bilaterally. All dorsiflexors, plantarflexors, invertors, evertors are intact bilaterally.  No tenderness present to bilateral feet or ankles on palpation.  No tenderness to bilateral feet or ankles with range of motion. Calf is soft/non-tender with/without warmth/induration    Imaging:         No results found.         Raf Velasquez; CARLOS  [facility] Foot & Ankle Surgery/Podiatry     "

## 2023-10-04 NOTE — Clinical Note
10/4/2023         RE: Jose Rapp  [address] [address]        Dear Colleague,    Thank you for referring your patient, Jose Rapp, to the [facility]. Please see a copy of my visit note below.    No notes on file    Again, thank you for allowing me to participate in the care of your patient.        Sincerely,        Raf Velasquez DPM

## 2023-10-06 ENCOUNTER — TRANSFERRED RECORDS (OUTPATIENT)
Dept: HEALTH INFORMATION MANAGEMENT | Facility: CLINIC | Age: 38
End: 2023-10-06
Payer: COMMERCIAL

## 2023-11-03 ENCOUNTER — OFFICE VISIT (OUTPATIENT)
Dept: FAMILY MEDICINE | Facility: CLINIC | Age: 38
End: 2023-11-03
Payer: COMMERCIAL

## 2023-11-03 VITALS
OXYGEN SATURATION: 96 % | DIASTOLIC BLOOD PRESSURE: 82 MMHG | BODY MASS INDEX: 43.87 KG/M2 | HEIGHT: 66 IN | WEIGHT: 273 LBS | RESPIRATION RATE: 18 BRPM | HEART RATE: 69 BPM | SYSTOLIC BLOOD PRESSURE: 120 MMHG

## 2023-11-03 DIAGNOSIS — Z13.1 SCREENING FOR DIABETES MELLITUS: ICD-10-CM

## 2023-11-03 DIAGNOSIS — Z13.220 SCREENING FOR HYPERLIPIDEMIA: ICD-10-CM

## 2023-11-03 DIAGNOSIS — R91.8 PULMONARY NODULES: ICD-10-CM

## 2023-11-03 DIAGNOSIS — Z00.00 ANNUAL PHYSICAL EXAM: Primary | ICD-10-CM

## 2023-11-03 DIAGNOSIS — E66.9 OBESITY, UNSPECIFIED CLASSIFICATION, UNSPECIFIED OBESITY TYPE, UNSPECIFIED WHETHER SERIOUS COMORBIDITY PRESENT: ICD-10-CM

## 2023-11-03 DIAGNOSIS — G47.33 OSA (OBSTRUCTIVE SLEEP APNEA): ICD-10-CM

## 2023-11-03 DIAGNOSIS — E66.01 MORBID OBESITY (H): ICD-10-CM

## 2023-11-03 LAB
ERYTHROCYTE [DISTWIDTH] IN BLOOD BY AUTOMATED COUNT: 12.5 % (ref 10–15)
HBA1C MFR BLD: 5.2 % (ref 0–5.6)
HCT VFR BLD AUTO: 40.1 % (ref 40–53)
HGB BLD-MCNC: 14.4 G/DL (ref 13.3–17.7)
MCH RBC QN AUTO: 30.5 PG (ref 26.5–33)
MCHC RBC AUTO-ENTMCNC: 35.9 G/DL (ref 31.5–36.5)
MCV RBC AUTO: 85 FL (ref 78–100)
PLATELET # BLD AUTO: 216 10E3/UL (ref 150–450)
RBC # BLD AUTO: 4.72 10E6/UL (ref 4.4–5.9)
WBC # BLD AUTO: 8 10E3/UL (ref 4–11)

## 2023-11-03 PROCEDURE — 99395 PREV VISIT EST AGE 18-39: CPT | Performed by: PHYSICIAN ASSISTANT

## 2023-11-03 PROCEDURE — 80061 LIPID PANEL: CPT | Performed by: PHYSICIAN ASSISTANT

## 2023-11-03 PROCEDURE — 99214 OFFICE O/P EST MOD 30 MIN: CPT | Mod: 25 | Performed by: PHYSICIAN ASSISTANT

## 2023-11-03 PROCEDURE — 83036 HEMOGLOBIN GLYCOSYLATED A1C: CPT | Performed by: PHYSICIAN ASSISTANT

## 2023-11-03 PROCEDURE — 80053 COMPREHEN METABOLIC PANEL: CPT | Performed by: PHYSICIAN ASSISTANT

## 2023-11-03 PROCEDURE — 85027 COMPLETE CBC AUTOMATED: CPT | Performed by: PHYSICIAN ASSISTANT

## 2023-11-03 PROCEDURE — 36415 COLL VENOUS BLD VENIPUNCTURE: CPT | Performed by: PHYSICIAN ASSISTANT

## 2023-11-03 ASSESSMENT — ENCOUNTER SYMPTOMS
HEMATOCHEZIA: 0
HEARTBURN: 0
DIARRHEA: 0
ABDOMINAL PAIN: 0
NERVOUS/ANXIOUS: 0
JOINT SWELLING: 1
HEMATURIA: 0
CHILLS: 0
FREQUENCY: 0
NAUSEA: 0
FEVER: 0
SORE THROAT: 0
DIZZINESS: 0
COUGH: 0
HEADACHES: 0
CONSTIPATION: 0
WEAKNESS: 0
ARTHRALGIAS: 1
PARESTHESIAS: 0
MYALGIAS: 1
SHORTNESS OF BREATH: 0
EYE PAIN: 0
DYSURIA: 0

## 2023-11-03 ASSESSMENT — PAIN SCALES - GENERAL: PAINLEVEL: NO PAIN (0)

## 2023-11-03 NOTE — COMMUNITY RESOURCES LIST (ENGLISH)
11/03/2023   [facility]  N/A  For questions about this resource list or additional care needs, please contact your primary care clinic or care manager.  Phone: [phone]   Email: N/A   Address: [address] [place] [address]   Hours: N/A        Food and Nutrition       Food pantry  1  [employer] Distance: 1.76 miles      In-Person, Delivery, Pickup   [address] [place] [address]  Language: English  Hours: Tue 8:00 AM - 4:00 PM , Wed 5:30 PM - 6:30 PM  Fees: Free   Phone: (279) 777-3184 Email: info@i[employer] Website: http://www.iCabbi     2  [employer] Distance: 2.88 miles      In-Person, Delivery, Pickup   [address] [place] [address]  Language: English, Faroese  Hours: Mon - Fri 8:45 AM - 11:30 AM , Mon - Fri 1:00 PM - 3:30 PM  Fees: Free   Phone: (141) 958-8906 Email: info@[employer].org Website: http://www.[employer].org     SNAP application assistance  3  [facility] Economic [facility] Distance: 3.93 miles      In-Person, Phone/Virtual   [address] [place] [address]  Language: English  Hours: Mon - Fri 8:00 AM - 4:30 PM  Fees: Free   Phone: (176) 561-7267 Email: nicolette@[employer]. Website: https://www.[facility] [employer] [facility]./787/Economic-Support     4  [facility] Distance: 5.48 miles      In-Person, Phone/Virtual   [address] [place] [address]  Language: English  Hours: Mon - Fri 8:00 AM - 4:30 PM  Fees: Free   Phone: (774) 777-9902 Email: nicolette@[employer]. Website: https://www.[facility] [employer] [facility]./Facilities/Facility/Details/[facility]-22     Soup kitchen or free meals  5  [facility] [employer] Distance: 3 miles      In-Person, Pickup   [address] [place] [address]  Language: English  Hours: Mon - Thu 5:00 PM - 6:30 PM   Fees: Free   Phone: (548) 610-3990 Email: office@[employer] Website: http://Brandlive.[employer]/     6  [facility] - [employer] Meals Distance: 3.02 miles      Pickup   [address] [place] [address]  Language: English  Hours: Mon 3:00 PM - 8:00 PM , Tue - Fri 5:00 AM - 8:00 PM , Sat 7:00 AM - 2:00 PM  Fees: Free   Phone: (476) 155-5423 Website: https://communityed.\[facility]\"".org/          Important Numbers & Websites       Emergency Services   911  [facility] Services   311  Poison Control   (637) 545-1941  Suicide Prevention Lifeline   (667) 726-8353 (TALK)  Child Abuse Hotline   (833) 848-3292 (4-A-Child)  Sexual Assault Hotline   (299) 622-6140 (HOPE)  National Runaway Safeline   (289) 819-6387 (RUNAWAY)  All-Options Talkline   (876) 452-4688  Substance Abuse Referral   (696) 142-8745 (HELP)
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
## 2023-11-03 NOTE — COMMUNITY RESOURCES LIST (ENGLISH)
11/03/2023   [facility]  N/A  For additional resource needs, please contact your health insurance member services or your primary care team.  Phone: [phone]   Email: N/A   Address: [address] [place] [address]   Hours: N/A        Food and Nutrition       Food pantry  1  [employer] Distance: 1.76 miles      In-Person, Delivery, Pickup   [address] [place] [address]  Language: English  Hours: Tue 8:00 AM - 4:00 PM , Wed 5:30 PM - 6:30 PM  Fees: Free   Phone: (847) 189-2157 Email: info@[employer] Website: http://www.[employer]     2  [employer] Distance: 2.88 miles      In-Person, Delivery, Pickup   [address] [place] [address]  Language: English, Bahraini  Hours: Mon - Fri 8:45 AM - 11:30 AM , Mon - Fri 1:00 PM - 3:30 PM  Fees: Free   Phone: (754) 594-7291 Email: info@[employer].org Website: http://www.[employer].org     SNAP application assistance  3  [employer] [place] Distance: 8.28 miles      Phone/Virtual   [address] [address]  Language: English, Hmong, Turkish, Citizen of the [place], Bahraini  Hours: Mon - Fri 8:30 AM - 4:30 PM  Fees: Free   Phone: (768) 103-5604 Email: helpline@[employer].org Website: https://www.hungersolutions.org/programs/[place]-food-helpline/     4  [facility] Economic Support [facility] Distance: 3.93 miles      In-Person, Phone/Virtual   [address] [place] [address]  Language: English  Hours: Mon - Fri 8:00 AM - 4:30 PM  Fees: Free   Phone: (901) 636-8611 Email: nicolette@[employer].mn. Website: https://www.[facility].mn.us/787/Economic-Support     Soup kitchen or free meals  5  [facility] - Loaves [employer] Fishes Distance: 3 miles      In-Person, Pickup   [address] [place] [address]  Language: English  Hours: Mon - Thu 5:00 PM - 6:30 PM  Fees: Free   Phone: (266) 515-6400 Email:  office@[employer] Website: http://[employer].org/     6  [facility] - Take 'n Bake Meals Distance: 3.02 miles      Pickup   [address] [place] [address]  Language: English  Hours: Mon 3:00 PM - 8:00 PM , Tue - Fri 5:00 AM - 8:00 PM , Sat 7:00 AM - 2:00 PM  Fees: Free   Phone: (797) 493-2944 Website: https://communityed.\[facility]\"".org/          Important Numbers & Websites       [address].org  Poison Control   (510) 411-9723 [place]poison.[place]  Suicide and Crisis Lifeline   [address] [place], [place] [address]line.org  Childhelp [place] Child Abuse Hotline   253.663.2940 Childhelphotline.org  [place] Sexual Assault Hotline   (254) 566-9957 (HOPE) [facility] Runaway Safeline   (479) 864-7498 (RUNAWAY) 1800runaway.org  Pregnancy & Postpartum Support [place]   Call/text 090-630-7971 Ppsupportmn.org  Substance Abuse National Helpline ([facility]   051-765-HELP (4505) Findtreatment.gov  Emergency Services   911

## 2023-11-04 PROBLEM — R91.8 PULMONARY NODULES: Status: ACTIVE | Noted: 2023-11-04

## 2023-11-06 LAB
ALBUMIN SERPL BCG-MCNC: 4.6 G/DL (ref 3.5–5.2)
ALP SERPL-CCNC: 58 U/L (ref 40–129)
ALT SERPL W P-5'-P-CCNC: 37 U/L (ref 0–70)
ANION GAP SERPL CALCULATED.3IONS-SCNC: 12 MMOL/L (ref 7–15)
AST SERPL W P-5'-P-CCNC: 38 U/L (ref 0–45)
BILIRUB SERPL-MCNC: 2.8 MG/DL
BUN SERPL-MCNC: 14.3 MG/DL (ref 6–20)
CALCIUM SERPL-MCNC: 9.5 MG/DL (ref 8.6–10)
CHLORIDE SERPL-SCNC: 104 MMOL/L (ref 98–107)
CHOLEST SERPL-MCNC: 168 MG/DL
CREAT SERPL-MCNC: 0.71 MG/DL (ref 0.67–1.17)
DEPRECATED HCO3 PLAS-SCNC: 24 MMOL/L (ref 22–29)
EGFRCR SERPLBLD CKD-EPI 2021: >90 ML/MIN/1.73M2
GLUCOSE SERPL-MCNC: 81 MG/DL (ref 70–99)
HDLC SERPL-MCNC: 29 MG/DL
LDLC SERPL CALC-MCNC: 111 MG/DL
NONHDLC SERPL-MCNC: 139 MG/DL
POTASSIUM SERPL-SCNC: 4 MMOL/L (ref 3.4–5.3)
PROT SERPL-MCNC: 7.7 G/DL (ref 6.4–8.3)
SODIUM SERPL-SCNC: 140 MMOL/L (ref 135–145)
TRIGL SERPL-MCNC: 142 MG/DL

## 2023-11-10 ENCOUNTER — DOCUMENTATION ONLY (OUTPATIENT)
Dept: SLEEP MEDICINE | Facility: CLINIC | Age: 38
End: 2023-11-10
Payer: COMMERCIAL

## 2023-11-10 DIAGNOSIS — G47.33 OBSTRUCTIVE SLEEP APNEA (ADULT) (PEDIATRIC): Primary | ICD-10-CM

## 2023-11-10 NOTE — PROGRESS NOTES
*Restart on CPAP*    Patient was offered choice of vendor and chose [facility].  Patient Jose Rapp was set up at [facility]  on November 10, 2023. Patient received a Resmed Airsense 11 Pressures were set at  10-20 cm H2O.   Patient s ramp is 5 cm H2O for Auto and FLEX/EPR is EPR, 2.  Patient received a Resmed Mask name: Aiorfit F30i small frame  Full Face mask size small wide, heated tubing and heated humidifier.  Patient has the following compliance requirements: usage only  Patient has a follow up on TBD with Dr. Garza.    Sydney Her

## 2023-11-15 ENCOUNTER — DOCUMENTATION ONLY (OUTPATIENT)
Dept: SLEEP MEDICINE | Facility: CLINIC | Age: 38
End: 2023-11-15
Payer: COMMERCIAL

## 2023-11-15 NOTE — PROGRESS NOTES
3 day Sleep therapy management telephone visit    Diagnostic AHI: 112.6  PSG     Confirmed with patient at time of call- N/A Patient is still interested in STM service       Message left for patient to return call.        Objective data     Order Settings for PAP  CPAP min     CPAP max     CPAP fixed 15        Device settings from machine CPAP min 10.0     CPAP max 20.0           EPR Setting TWO    RESMED soft response  OFF     Assessment: Nightly usage over four hours      Action plan: Patient to have 14 day STM visit. Patient has a follow up visit scheduled:   no    Replacement device: No  STM ordered by provider: Yes     Total time spent on accessing and  interpreting remote patient PAP therapy data  10 minutes    Total time spent counseling, coaching  and reviewing PAP therapy data with patient  1 minutes    [id number] no

## 2023-11-21 ENCOUNTER — OFFICE VISIT (OUTPATIENT)
Dept: FAMILY MEDICINE | Facility: CLINIC | Age: 38
End: 2023-11-21
Payer: COMMERCIAL

## 2023-11-21 DIAGNOSIS — G47.33 OSA (OBSTRUCTIVE SLEEP APNEA): ICD-10-CM

## 2023-11-21 DIAGNOSIS — S83.207D TEAR OF MENISCUS OF LEFT KNEE AS CURRENT INJURY, UNSPECIFIED MENISCUS, UNSPECIFIED TEAR TYPE, SUBSEQUENT ENCOUNTER: ICD-10-CM

## 2023-11-21 DIAGNOSIS — Z01.818 PREOP GENERAL PHYSICAL EXAM: Primary | ICD-10-CM

## 2023-11-21 DIAGNOSIS — R91.8 PULMONARY NODULES: ICD-10-CM

## 2023-11-21 DIAGNOSIS — R16.1 ENLARGEMENT, SPLEEN: ICD-10-CM

## 2023-11-21 DIAGNOSIS — R91.8 PULMONARY NODULES: Primary | ICD-10-CM

## 2023-11-21 PROCEDURE — 99214 OFFICE O/P EST MOD 30 MIN: CPT | Performed by: PHYSICIAN ASSISTANT

## 2023-11-21 ASSESSMENT — ENCOUNTER SYMPTOMS
MYALGIAS: 0
DYSURIA: 0
ALLERGIC/IMMUNOLOGIC NEGATIVE: 1
EYE PAIN: 0
FEVER: 0
SHORTNESS OF BREATH: 0
ABDOMINAL PAIN: 0
PALPITATIONS: 0
CONSTIPATION: 0
HEADACHES: 0
CHILLS: 0
BRUISES/BLEEDS EASILY: 0
SORE THROAT: 0
NERVOUS/ANXIOUS: 0
PARESTHESIAS: 0
ARTHRALGIAS: 0
COUGH: 0
DIARRHEA: 0
FREQUENCY: 0
WEAKNESS: 0
DIZZINESS: 0

## 2023-11-21 NOTE — PROGRESS NOTES
[facility]  [address]  [place]  [facility] [address]  Phone: [phone]  Fax: [phone]  Primary Provider: Velvet Martin  Pre-op Performing Provider: VELVET MARTIN      PREOPERATIVE EVALUATION:  Today's date: 11/21/2023    Toby is a 38 year old, presenting for the following:  Pre-Op Exam (DOS 12/5/23 for L meniscus repair at [facility]: Lon w/ Dr. Denver Michel)        11/21/2023     3:43 PM   Additional Questions   Roomed by Tomasa Morales   Accompanied by none       Surgical Information:  Surgery/Procedure: L meniscus repair  Surgery Location: [facility]  Surgery Date: 12/5/23  Time of Surgery: TBD  Where patient plans to recover: At home with family  Fax number for surgical facility: [phone]    Assessment & Plan     The proposed surgical procedure is considered INTERMEDIATE risk.    Preop general physical exam  Tear of meniscus of left knee as current injury, unspecified meniscus, unspecified tear type, subsequent encounter  Toby will be undergoing a left meniscus repair to his knee on 12/5/2023 with [facility].  Feeling well today.  Denies any chest pain, shortness of breath, lightheaded or dizziness.  Recent labs at his physical were unremarkable.    Pulmonary nodules  Pulmonary nodule noted on CTA chest 11/19/2022.  Multiple small nodules with greatest size of 6.5 mm.  He is a non-smoker.  We will recheck a CT for comparison purposes.    MARCELINO (obstructive sleep apnea)  Compliant with CPAP    Splenomegaly  Mild splenomegaly noted on CTA chest 11/19/2022.  Recommended further follow-up with ultrasound.  He is agreeable.        - No identified additional risk factors other than previously addressed    Antiplatelet or Anticoagulation Medication Instructions:   - Patient is on no antiplatelet or anticoagulation medications.    Additional Medication Instructions:  Hold all NSAIDs, multivitamins, and herbal  supplements 7 days prior to the procedure    RECOMMENDATION:  APPROVAL GIVEN to proceed with proposed procedure, without further diagnostic evaluation.  }    Subjective       HPI related to upcoming procedure: Toby is a pleasant 38-year-old male who presents to the clinic today for a preop physical.  He will be undergoing a left meniscus repair on 12/5/2023 with [facility].  He is feeling well today.  No questions or concerns regarding his chronic health.  He denies any chest pains, shortness of breath, lightheaded or dizziness.  He was changing the spark plugs in his vehicle he was coming off of a step ladder and twisted his ankle/knee and landed on his right side.  He has been having pain since.        11/21/2023     3:42 PM   Preop Questions   1. Have you ever had a heart attack or stroke? No   2. Have you ever had surgery on your heart or blood vessels, such as a stent placement, a coronary artery bypass, or surgery on an artery in your head, neck, heart, or legs? No   3. Do you have chest pain with activity? No   4. Do you have a history of  heart failure? No   5. Do you currently have a cold, bronchitis or symptoms of other infection? No   6. Do you have a cough, shortness of breath, or wheezing? No   7. Do you or anyone in your family have previous history of blood clots? UNKNOWN - adopted   8. Do you or does anyone in your family have a serious bleeding problem such as prolonged bleeding following surgeries or cuts? UNKNOWN - adopted   9. Have you ever had problems with anemia or been told to take iron pills? No   10. Have you had any abnormal blood loss such as black, tarry or bloody stools? No   11. Have you ever had a blood transfusion? No   12. Are you willing to have a blood transfusion if it is medically needed before, during, or after your surgery? Yes   13. Have you or any of your relatives ever had problems with anesthesia? No   14. Do you have sleep apnea, excessive snoring or daytime  drowsiness? YES - CPAP   14a. Do you have a CPAP machine? Yes   15. Do you have any artifical heart valves or other implanted medical devices like a pacemaker, defibrillator, or continuous glucose monitor? No   16. Do you have artificial joints? No   17. Are you allergic to latex? No       Review of Systems   Constitutional:  Negative for chills and fever.   HENT:  Negative for congestion, ear pain, hearing loss and sore throat.    Eyes:  Negative for pain and visual disturbance.   Respiratory:  Negative for cough and shortness of breath.    Cardiovascular:  Negative for chest pain, palpitations and peripheral edema.   Gastrointestinal:  Negative for abdominal pain, constipation and diarrhea.   Endocrine: Negative for polyuria.   Genitourinary:  Negative for dysuria and frequency.   Musculoskeletal:  Negative for arthralgias and myalgias.   Skin:  Negative for rash.   Allergic/Immunologic: Negative.    Neurological:  Negative for dizziness, weakness, headaches and paresthesias.   Hematological:  Does not bruise/bleed easily.   Psychiatric/Behavioral:  Negative for mood changes. The patient is not nervous/anxious.        Patient Active Problem List    Diagnosis Date Noted    Enlargement, spleen 11/21/2023     Priority: Medium    Pulmonary nodules 11/04/2023     Priority: Medium    MARCELINO (obstructive sleep apnea) 01/17/2022     Priority: Medium    Morbid obesity (H) 03/31/2021     Priority: Medium    Meralgia Paresthetica On The Left      Priority: Medium     Created by Conversion        Obesity      Priority: Medium     Created by Conversion        Snoring (Symptom)      Priority: Medium     Created by Conversion          Past Medical History:   Diagnosis Date    Back pain     Obesity     Sleep apnea     uses CPAP     Past Surgical History:   Procedure Laterality Date    AMPUTATE FINGER(S) Left     lost very tip of finger in doorframe     No current outpatient medications on file.       No Known Allergies     Social  "History     Tobacco Use    Smoking status: Never     Passive exposure: Never    Smokeless tobacco: Never   Substance Use Topics    Alcohol use: No     Family History   Adopted: Yes   Problem Relation Age of Onset    Diabetes Father     Heart Disease Father      History   Drug Use No         Objective     /82 (BP Location: Left arm, Patient Position: Sitting, Cuff Size: Adult Large)   Pulse 71   Temp 98.7  F (37.1  C) (Oral)   Resp 16   Ht 1.676 m (5' 6\")   Wt 123.8 kg (273 lb)   SpO2 97%   BMI 44.06 kg/m      Physical Exam  Vitals and nursing note reviewed.   Constitutional:       General: He is not in acute distress.     Appearance: Normal appearance. He is well-developed and well-groomed. He is not ill-appearing or toxic-appearing.   HENT:      Head: Normocephalic and atraumatic.      Right Ear: Tympanic membrane, ear canal and external ear normal.      Left Ear: Tympanic membrane, ear canal and external ear normal.      Mouth/Throat:      Lips: Pink.      Mouth: Mucous membranes are moist.      Palate: No mass.      Pharynx: Oropharynx is clear. Uvula midline.      Tonsils: No tonsillar exudate or tonsillar abscesses.   Eyes:      General: Lids are normal.         Right eye: No discharge.         Left eye: No discharge.   Neck:      Trachea: Trachea normal.   Cardiovascular:      Rate and Rhythm: Normal rate and regular rhythm.      Heart sounds: S1 normal and S2 normal. No murmur heard.  Pulmonary:      Effort: Pulmonary effort is normal.      Breath sounds: Normal breath sounds and air entry.   Abdominal:      General: Bowel sounds are normal. There is no distension.      Palpations: Abdomen is soft.      Tenderness: There is no abdominal tenderness. There is no guarding or rebound.   Musculoskeletal:      Cervical back: Full passive range of motion without pain and neck supple.      Right lower leg: No edema.      Left lower leg: No edema.   Lymphadenopathy:      Cervical: No cervical adenopathy. "   Skin:     General: Skin is warm and dry.      Findings: No lesion or rash.   Neurological:      General: No focal deficit present.      Mental Status: He is alert.      Cranial Nerves: No cranial nerve deficit.      Gait: Gait is intact.      Deep Tendon Reflexes:      Reflex Scores:       Patellar reflexes are 2+ on the right side and 2+ on the left side.  Psychiatric:         Attention and Perception: Attention normal.         Mood and Affect: Mood normal.         Speech: Speech normal.           Recent Labs   Lab Test 11/03/23  1639 11/19/22  2328   HGB 14.4  --      --      --    POTASSIUM 4.0  --    CR 0.71 0.6*   A1C 5.2  --         Diagnostics:  Recent Results (from the past 720 hour(s))   CBC with platelets    Collection Time: 11/03/23  4:39 PM   Result Value Ref Range    WBC Count 8.0 4.0 - 11.0 10e3/uL    RBC Count 4.72 4.40 - 5.90 10e6/uL    Hemoglobin 14.4 13.3 - 17.7 g/dL    Hematocrit 40.1 40.0 - 53.0 %    MCV 85 78 - 100 fL    MCH 30.5 26.5 - 33.0 pg    MCHC 35.9 31.5 - 36.5 g/dL    RDW 12.5 10.0 - 15.0 %    Platelet Count 216 150 - 450 10e3/uL   Comprehensive metabolic panel (BMP + Alb, Alk Phos, ALT, AST, Total. Bili, TP)    Collection Time: 11/03/23  4:39 PM   Result Value Ref Range    Sodium 140 135 - 145 mmol/L    Potassium 4.0 3.4 - 5.3 mmol/L    Carbon Dioxide (CO2) 24 22 - 29 mmol/L    Anion Gap 12 7 - 15 mmol/L    Urea Nitrogen 14.3 6.0 - 20.0 mg/dL    Creatinine 0.71 0.67 - 1.17 mg/dL    GFR Estimate >90 >60 mL/min/1.73m2    Calcium 9.5 8.6 - 10.0 mg/dL    Chloride 104 98 - 107 mmol/L    Glucose 81 70 - 99 mg/dL    Alkaline Phosphatase 58 40 - 129 U/L    AST 38 0 - 45 U/L    ALT 37 0 - 70 U/L    Protein Total 7.7 6.4 - 8.3 g/dL    Albumin 4.6 3.5 - 5.2 g/dL    Bilirubin Total 2.8 (H) <=1.2 mg/dL   Lipid panel reflex to direct LDL Fasting    Collection Time: 11/03/23  4:39 PM   Result Value Ref Range    Cholesterol 168 <200 mg/dL    Triglycerides 142 <150 mg/dL    Direct  Measure HDL 29 (L) >=40 mg/dL    LDL Cholesterol Calculated 111 (H) <=100 mg/dL    Non HDL Cholesterol 139 (H) <130 mg/dL   Hemoglobin A1c    Collection Time: 11/03/23  4:39 PM   Result Value Ref Range    Hemoglobin A1C 5.2 0.0 - 5.6 %      No EKG required, no history of coronary heart disease, significant arrhythmia, peripheral arterial disease or other structural heart disease.    Revised Cardiac Risk Index (RCRI):  The patient has the following serious cardiovascular risks for perioperative complications:   - No serious cardiac risks = 0 points     RCRI Interpretation: 0 points: Class I (very low risk - 0.4% complication rate)         Signed Electronically by: Kashif Soliz PA-C  Copy of this evaluation report is provided to requesting physician.

## 2023-11-21 NOTE — COMMUNITY RESOURCES LIST (ENGLISH)
11/21/2023   [facility]  N/A  For questions about this resource list or additional care needs, please contact your primary care clinic or care manager.  Phone: [phone]   Email: N/A   Address: [address] [place] [address]   Hours: N/A        Financial Stability       Utility payment assistance  1  [employer] - Emergency Assistance [place] - Utility payment assistance Distance: 2.88 miles      In-Person, Phone/Virtual   [address] [place] [address]  Language: English, Ugandan  Hours: Mon - Fri 9:00 AM - 4:00 PM  Fees: Free   Phone: (513) 775-5490 Email: info@[employer] Website: http://www.[employer]     2  [facility] Distance: 3.81 miles      In-Person, Phone/Virtual   [address] [place] [address]  Language: English  Hours: Mon - Fri 8:00 AM - 12:00 PM , Mon - Fri 1:00 PM - 4:00 PM  Fees: Free   Phone: (827) 914-7562 Email: chris@[facility] Website: https://[facility].org/[facility]/socialservices-[facility]/          Food and Nutrition       Food pantry  3  [employer] Distance: 1.74 miles      In-Person, Delivery, Pickup   [address] [place] [address]  Language: English  Hours: Tue 8:00 AM - 4:00 PM , Wed 5:30 PM - 6:30 PM  Fees: Free   Phone: (700) 922-6962 Email: info@[employer] Website: http://www.[employer]     4  [employer] Distance: 2.88 miles      In-Person, Delivery, Pickup   [address] [place] [address]  Language: English, Ugandan  Hours: Mon - Fri 8:45 AM - 11:30 AM , Mon - Fri 1:00 PM - 3:30 PM  Fees: Free   Phone: (320) 920-2278 Email: info@[employer] Website: http://www.[employer].org     SNAP application assistance  5  [facility] Economic Support [facility] Distance: 3.94 miles      In-Person, Phone/Virtual   [place]  [address]  Language: English  Hours: Mon - Fri 8:00 AM - 4:30 PM  Fees: Free   Phone: (949) 337-3759 Email: nicolette@[facility]. Website: https://www.[facility]./787/Economic-Support     6  [facility] Distance: 5.47 miles      In-Person, Phone/Virtual   [address] [place] [address]  Language: English  Hours: Mon - Fri 8:00 AM - 4:30 PM  Fees: Free   Phone: (248) 243-1845 Email: [email or web address] Website: https://www.[facility]/Facilities/Facility/Details/[place]-22     Soup kitchen or free meals  7  [facility] Distance: 2.99 miles      In-Person, Pickup   [address] [place] [address]  Language: English  Hours: Mon - Thu 5:00 PM - 6:30 PM  Fees: Free   Phone: (530) 238-6049 Email: office@[employer] Website: http://[employer]/     8  [facility] - [employer] Distance: 3.02 miles      Pickup   [address] [place] [address]  Language: English  Hours: Mon 3:00 PM - 8:00 PM , Tue - Fri 5:00 AM - 8:00 PM , Sat 7:00 AM - 2:00 PM  Fees: Free   Phone: (392) 558-5523 Website: https://communityed.[facility].org/          Important Numbers & Websites       Emergency Services   911  City Services   311  Poison Control   (615) 257-8961  Suicide Prevention Lifeline   (513) 730-5682 (TALK)  Child Abuse Hotline   (721) 765-3508 (4-A-Child)  Sexual Assault Hotline   (663) 556-5617 (HOPE)  National Runaway Safeline   (995) 506-5550 (RUNAWAY)  All-Options Talkline   (998) 424-7521  Substance Abuse Referral   (259) 365-9232 (HELP)

## 2023-11-29 ENCOUNTER — TRANSFERRED RECORDS (OUTPATIENT)
Dept: HEALTH INFORMATION MANAGEMENT | Facility: CLINIC | Age: 38
End: 2023-11-29
Payer: COMMERCIAL

## 2023-12-01 ENCOUNTER — TELEPHONE (OUTPATIENT)
Dept: FAMILY MEDICINE | Facility: CLINIC | Age: 38
End: 2023-12-01
Payer: COMMERCIAL

## 2023-12-01 VITALS
HEIGHT: 66 IN | DIASTOLIC BLOOD PRESSURE: 82 MMHG | RESPIRATION RATE: 16 BRPM | HEART RATE: 71 BPM | WEIGHT: 273 LBS | SYSTOLIC BLOOD PRESSURE: 124 MMHG | BODY MASS INDEX: 43.87 KG/M2 | TEMPERATURE: 98.7 F | OXYGEN SATURATION: 97 %

## 2023-12-01 NOTE — TELEPHONE ENCOUNTER
General Call    Contacts         Type Contact Phone/Fax    12/01/2023 03:00 PM CST Phone (Incoming) Toby Rapp (Self) [phone] (M)          Reason for Call:  height correction    What are your questions or concerns:  pt calling as his surgery 912/5/23) has been cancelled due to our reported height is shown as 5'3 and this puts his BMI over their limit.     He is not 5'3, he is 5'6 or even 7...and that would correct this error and he could have his surgery put back on schedule.    He said if he needs to come back in to have re-done he will, but was just in on 11/3/23 and it looks correct there.    Please call him asap to have corrected so he can get rescheduled today.    This is per [facility] but he did not have number or fax. States we should as he was just here 11/21/23

## 2023-12-08 ENCOUNTER — HOSPITAL ENCOUNTER (OUTPATIENT)
Dept: CT IMAGING | Facility: CLINIC | Age: 38
Discharge: HOME | End: 2023-12-08
Attending: PHYSICIAN ASSISTANT
Payer: COMMERCIAL

## 2023-12-08 ENCOUNTER — HOSPITAL ENCOUNTER (OUTPATIENT)
Dept: ULTRASOUND IMAGING | Facility: CLINIC | Age: 38
Discharge: HOME | End: 2023-12-08
Attending: PHYSICIAN ASSISTANT
Payer: COMMERCIAL

## 2023-12-08 DIAGNOSIS — R16.1 ENLARGEMENT, SPLEEN: ICD-10-CM

## 2023-12-08 DIAGNOSIS — R91.8 PULMONARY NODULES: ICD-10-CM

## 2023-12-08 PROCEDURE — 71250 CT THORAX DX C-: CPT

## 2023-12-08 PROCEDURE — 76705 ECHO EXAM OF ABDOMEN: CPT

## 2023-12-12 ENCOUNTER — E-CONSULT (OUTPATIENT)
Dept: ONCOLOGY | Facility: CLINIC | Age: 38
End: 2023-12-12
Payer: COMMERCIAL

## 2023-12-12 DIAGNOSIS — R16.1 ENLARGEMENT, SPLEEN: Primary | ICD-10-CM

## 2023-12-12 PROCEDURE — 99207 E-CONSULT TO HEMATOLOGY (ADULT OUTPT PROVIDER TO SPECIALIST WRITTEN QUESTION & RESPONSE): CPT | Performed by: PHYSICIAN ASSISTANT

## 2023-12-12 PROCEDURE — 99451 NTRPROF PH1/NTRNET/EHR 5/>: CPT | Performed by: INTERNAL MEDICINE

## 2023-12-12 NOTE — PROGRESS NOTES
12/12/2023     E-Consult has been accepted.    Interprofessional consultation requested by:  Kashif Soliz PA-C      Clinical Question/Purpose: MY CLINICAL QUESTION IS: Ongoing splenomegaly.  Initial CTA chest 11/19/2022 showed mild splenomegaly.  Follow-up ultrasound done 12/8/2023 showed an enlarged spleen of 15.5 cm.  CBC within normal range.  Wondering if there needs to be any other follow-up or evaluation regarding this    Patient assessment and information reviewed: 38 year old man with incidental splenomegaly, normal blood counts and feeling well. No concerns about malignancy.  Possibly is due to old EBV infection.     Recommendations:  Would monitor syptomatically only and check CBC annual.       The recommendations provided in this E-Consult are based on a review of clinical data pertinent to the clinical question presented, without a review of the patient's complete medical record or, the benefit of a comprehensive in-person or virtual patient evaluation. This consultation should not replace the clinical judgement and evaluation of the provider ordering this E-Consult. Any new clinical issues, or changes in patient status since the filing of this E-Consult will need to be taken into account when assessing these recommendations. Please contact me if you have further questions.    My total time spent reviewing clinical information and formulating assessment was 5 minutes.        Maurizio Ramirez MD

## 2023-12-15 ENCOUNTER — TRANSFERRED RECORDS (OUTPATIENT)
Dept: HEALTH INFORMATION MANAGEMENT | Facility: CLINIC | Age: 38
End: 2023-12-15
Payer: COMMERCIAL

## 2023-12-15 ENCOUNTER — TELEPHONE (OUTPATIENT)
Dept: FAMILY MEDICINE | Facility: CLINIC | Age: 38
End: 2023-12-15
Payer: COMMERCIAL

## 2023-12-15 NOTE — TELEPHONE ENCOUNTER
Message from Oleksandr Soliz:  Please call pt and let him know I sent in a E-Consult to hem/onc regarding his enlarged spleen and they are not concerned at this time as long as his labs are normal and he is not having any symptoms. He dose not need to see them at this time. Thank you       Pt notified. Per Oleksandr- pt to watch for flu-like symptoms or LUQ pain and follow up if necessary. Pt notified and understands.

## 2024-01-11 ENCOUNTER — TRANSFERRED RECORDS (OUTPATIENT)
Dept: HEALTH INFORMATION MANAGEMENT | Facility: CLINIC | Age: 39
End: 2024-01-11

## 2024-02-02 ENCOUNTER — TRANSFERRED RECORDS (OUTPATIENT)
Dept: HEALTH INFORMATION MANAGEMENT | Facility: CLINIC | Age: 39
End: 2024-02-02

## 2024-02-08 ENCOUNTER — OFFICE VISIT (OUTPATIENT)
Dept: AUDIOLOGY | Facility: CLINIC | Age: 39
End: 2024-02-08
Payer: COMMERCIAL

## 2024-02-08 DIAGNOSIS — H90.3 SENSORINEURAL HEARING LOSS, BILATERAL: Primary | ICD-10-CM

## 2024-02-08 DIAGNOSIS — H90.3 SENSORINEURAL HEARING LOSS, BILATERAL: ICD-10-CM

## 2024-02-08 DIAGNOSIS — Z77.122 HISTORY OF EXPOSURE TO NOISE: Primary | ICD-10-CM

## 2024-02-08 PROCEDURE — 92557 COMPREHENSIVE HEARING TEST: CPT | Performed by: AUDIOLOGIST

## 2024-02-08 PROCEDURE — 92550 TYMPANOMETRY & REFLEX THRESH: CPT | Performed by: AUDIOLOGIST

## 2024-02-08 NOTE — PROGRESS NOTES
"AUDIOLOGY REPORT    SUBJECTIVE:  Jose Rapp is a 38 year old male who was seen in the Audiology Clinic at the [facility] for audiologic evaluation, referred by Kashif Soliz PA-C. The patient reported that his wife is concerned that he does not hear her when the family is at home. The patient is less concerned; he indicated he has eight children (all at home), lives in a noisy house occupied by ten people, and has accommodated by tuning out distractions, including noise. He indicated he has more difficulty hearing his wife when she speaks to him from another room or at a distance, or when the TV is also on or music is playing. He perceived no difficulty hearing when using a phone. 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 He denied tinnitus, true vertigo, otalgia, otorrhea, recent illness, or aural fullness.    OBJECTIVE:  Abuse Screening:  Do you feel unsafe at home or work/school? No  Do you feel threatened by someone? No  Does anyone try to keep you from having contact with others, or doing things outside of your home? No  Physical signs of abuse present? No     Fall Risk Screen:  1. Have you fallen two or more times in the past year? No  2. Have you fallen and had an injury in the past year? No    Is patient a fall risk? No  Referral initiated: No  Fall Risk Assessment Completed by Audiology    Otoscopic exam indicates ears are clear of cerumen, bilaterally.    Pure Tone Thresholds assessed using conventional audiometry with good  reliability from 250-8000 Hz bilaterally using insert earphones and circumaural headphones.     RIGHT:  normal hearing sensitivity for 250-8000 Hz    LEFT:   normal hearing sensitivity for 250-8000 Hz    Tympanogram:    RIGHT: normal eardrum mobility    LEFT:   normal eardrum mobility    Reflexes for 1000 Hz (reported by stimulus ear):  RIGHT: " Ipsilateral is present at normal levels  RIGHT: Contralateral is present at normal levels  LEFT:   Ipsilateral is present at normal levels  LEFT:   Contralateral is present at normal levels      Speech Reception Threshold:    RIGHT: 5 dB HL    LEFT:   5 dB HL  Word Recognition Score:     RIGHT: 100% at 50 dB HL using NU-6 recorded word list.    LEFT:   100% at 50 dB HL using NU-6 recorded word list.      ASSESSMENT:     ICD-10-CM    1. History of exposure to noise  Z77.122           Mr. Rapp's hearing sensitivity is normal across the speech spectrum in both ears. Today s results were discussed with the patient in detail. Appropriate communication strategies were discussed at length, as were reasonable expectations regarding hearing at a distance, in noisy environments, or when attention is diverted elsewhere. A handout detailing this information was provided to the patient, to share with his wife and other frequent communication partners.     PLAN:  Mr. Rapp was counseled regarding hearing loss and its impact on communication.  Mr. Rapp should return for hearing evaluation per medical management or patient concern. Wear hearing protection consistently in noise to preserve current hearing sensitivity and to minimize the effects of noise on tinnitus. Keep volume levels on audio devices at or below the senior care point for safer listening.    Mr. Rapp expressed verbal understanding of this information and plan. Please call this clinic with questions regarding these results or recommendations.        Lexii Cruz, [facility]  [place] Licensed Audiologist [id number]

## 2024-03-01 ENCOUNTER — TRANSFERRED RECORDS (OUTPATIENT)
Dept: HEALTH INFORMATION MANAGEMENT | Facility: CLINIC | Age: 39
End: 2024-03-01

## 2024-05-21 ENCOUNTER — OFFICE VISIT (OUTPATIENT)
Dept: FAMILY MEDICINE | Facility: CLINIC | Age: 39
End: 2024-05-21
Payer: COMMERCIAL

## 2024-05-21 VITALS
HEART RATE: 61 BPM | RESPIRATION RATE: 16 BRPM | SYSTOLIC BLOOD PRESSURE: 144 MMHG | DIASTOLIC BLOOD PRESSURE: 88 MMHG | TEMPERATURE: 98 F | OXYGEN SATURATION: 98 %

## 2024-05-21 DIAGNOSIS — L60.0 ONYCHOCRYPTOSIS: Primary | ICD-10-CM

## 2024-05-21 PROCEDURE — 99213 OFFICE O/P EST LOW 20 MIN: CPT | Performed by: PHYSICIAN ASSISTANT

## 2024-05-21 RX ORDER — CEFADROXIL 500 MG/1
500 CAPSULE ORAL 2 TIMES DAILY
Qty: 20 CAPSULE | Refills: 0 | Status: SHIPPED | OUTPATIENT
Start: 2024-05-21 | End: 2024-05-31

## 2024-05-21 NOTE — PATIENT INSTRUCTIONS
Hot packs to the area 3 times per day 20 minutes on each hour.  Do not fall asleep with the hot packs on the skin.  Take the antibiotic as written  Symptoms should not be getting worse over the next 24 hours after taking the antibiotic.  Should have start of improvement after 48 hours on the antibiotic  Use of probiotics to help prevent diarrhea.  Separate dosing of the antibiotic from the probiotic by 2 hours or they are not in the stomach at the same time.  Use of over-the-counter yogurt for helping with stomach upset and diarrhea.  Return to clinic if not getting good resolution or if new symptoms or concerns arise.

## 2024-05-21 NOTE — PROGRESS NOTES
Patient presents with:  Leg Problem: Possible cellulitis of right leg  Ingrown Toenail: Ingrown toe nail great toe left foot       Clinical Decision Making:  Patient is treated with cefadroxil for the onychocryptosis.  Will refer to podiatry for definitive evaluation and treatment of the ingrown toenail.       ICD-10-CM    1. Onychocryptosis  L60.0 cefadroxil (DURICEF) 500 MG capsule     Orthopedic  Referral          Patient Instructions     Hot packs to the area 3 times per day 20 minutes on each hour.  Do not fall asleep with the hot packs on the skin.  Take the antibiotic as written  Symptoms should not be getting worse over the next 24 hours after taking the antibiotic.  Should have start of improvement after 48 hours on the antibiotic  Use of probiotics to help prevent diarrhea.  Separate dosing of the antibiotic from the probiotic by 2 hours or they are not in the stomach at the same time.  Use of over-the-counter yogurt for helping with stomach upset and diarrhea.  Return to clinic if not getting good resolution or if new symptoms or concerns arise.      HPI:  Jose Rapp is a 38 year old male who presents today for evaluation of infection and pain on the left great toe.  Patient has worsening symptoms over the last week.    History obtained from chart review and the patient.    Problem List:  2023-11: Enlargement, spleen  2023-11: Pulmonary nodules  2022-01: MARCELINO (obstructive sleep apnea)  2021-03: Morbid obesity (H)  Meralgia Paresthetica On The Left  Obesity  Snoring (Symptom)      Past Medical History:   Diagnosis Date    Back pain     Obesity     Sleep apnea     uses CPAP       Social History     Tobacco Use    Smoking status: Never     Passive exposure: Never    Smokeless tobacco: Never   Substance Use Topics    Alcohol use: No       Review of Systems  As above in HPI otherwise negative.    Vitals:    05/21/24 1705   BP: (!) 144/88   Pulse: 61   Resp: 16   Temp: 98  F (36.7  C)   TempSrc:  Oral   SpO2: 98%       General: Patient is resting comfortably no acute distress is afebrile  HEENT: Head is normocephalic atraumatic   Skin: Without rash non-diaphoretic  Musculoskeletal  Left foot has with the left hallux on the fibular side has a paronychia and onychocryptosis.  Is tender to palpation but no discharge      Physical Exam    At the end of the encounter, I discussed results, diagnosis, medications. Discussed red flags for immediate return to clinic/ER, as well as indications for follow up if no improvement. Patient understood and agreed to plan. Patient was stable for discharge.

## 2024-07-01 ENCOUNTER — TRANSFERRED RECORDS (OUTPATIENT)
Dept: HEALTH INFORMATION MANAGEMENT | Facility: CLINIC | Age: 39
End: 2024-07-01

## 2024-08-08 ENCOUNTER — TRANSFERRED RECORDS (OUTPATIENT)
Dept: HEALTH INFORMATION MANAGEMENT | Facility: CLINIC | Age: 39
End: 2024-08-08

## 2024-10-04 ENCOUNTER — PATIENT OUTREACH (OUTPATIENT)
Dept: CARE COORDINATION | Facility: CLINIC | Age: 39
End: 2024-10-04

## 2024-10-18 ENCOUNTER — PATIENT OUTREACH (OUTPATIENT)
Dept: CARE COORDINATION | Facility: CLINIC | Age: 39
End: 2024-10-18

## 2024-12-31 ENCOUNTER — OFFICE VISIT (OUTPATIENT)
Dept: FAMILY MEDICINE | Facility: CLINIC | Age: 39
End: 2024-12-31

## 2024-12-31 ENCOUNTER — TELEPHONE (OUTPATIENT)
Dept: FAMILY MEDICINE | Facility: CLINIC | Age: 39
End: 2024-12-31

## 2024-12-31 VITALS
SYSTOLIC BLOOD PRESSURE: 130 MMHG | BODY MASS INDEX: 44.36 KG/M2 | HEIGHT: 66 IN | RESPIRATION RATE: 16 BRPM | OXYGEN SATURATION: 97 % | WEIGHT: 276 LBS | HEART RATE: 65 BPM | TEMPERATURE: 98.1 F | DIASTOLIC BLOOD PRESSURE: 88 MMHG

## 2024-12-31 DIAGNOSIS — M54.9 CHRONIC BACK PAIN, UNSPECIFIED BACK LOCATION, UNSPECIFIED BACK PAIN LATERALITY: ICD-10-CM

## 2024-12-31 DIAGNOSIS — R16.1 ENLARGEMENT, SPLEEN: ICD-10-CM

## 2024-12-31 DIAGNOSIS — R91.8 PULMONARY NODULES: ICD-10-CM

## 2024-12-31 DIAGNOSIS — Z00.00 ANNUAL PHYSICAL EXAM: Primary | ICD-10-CM

## 2024-12-31 DIAGNOSIS — E78.5 HYPERLIPIDEMIA LDL GOAL <100: ICD-10-CM

## 2024-12-31 DIAGNOSIS — G89.29 CHRONIC BACK PAIN, UNSPECIFIED BACK LOCATION, UNSPECIFIED BACK PAIN LATERALITY: ICD-10-CM

## 2024-12-31 DIAGNOSIS — G47.33 OSA (OBSTRUCTIVE SLEEP APNEA): ICD-10-CM

## 2024-12-31 DIAGNOSIS — G89.29 CHRONIC BACK PAIN, UNSPECIFIED BACK LOCATION, UNSPECIFIED BACK PAIN LATERALITY: Primary | ICD-10-CM

## 2024-12-31 DIAGNOSIS — E66.01 MORBID OBESITY (H): ICD-10-CM

## 2024-12-31 DIAGNOSIS — M54.9 CHRONIC BACK PAIN, UNSPECIFIED BACK LOCATION, UNSPECIFIED BACK PAIN LATERALITY: Primary | ICD-10-CM

## 2024-12-31 LAB
ALBUMIN SERPL BCG-MCNC: 4.3 G/DL (ref 3.5–5.2)
ALP SERPL-CCNC: 58 U/L (ref 40–150)
ALT SERPL W P-5'-P-CCNC: 39 U/L (ref 0–70)
ANION GAP SERPL CALCULATED.3IONS-SCNC: 11 MMOL/L (ref 7–15)
AST SERPL W P-5'-P-CCNC: 34 U/L (ref 0–45)
BILIRUB SERPL-MCNC: 1.4 MG/DL
BUN SERPL-MCNC: 17 MG/DL (ref 6–20)
CALCIUM SERPL-MCNC: 9.4 MG/DL (ref 8.8–10.4)
CHLORIDE SERPL-SCNC: 104 MMOL/L (ref 98–107)
CHOLEST SERPL-MCNC: 167 MG/DL
CREAT SERPL-MCNC: 0.64 MG/DL (ref 0.67–1.17)
EGFRCR SERPLBLD CKD-EPI 2021: >90 ML/MIN/1.73M2
ERYTHROCYTE [DISTWIDTH] IN BLOOD BY AUTOMATED COUNT: 12.2 % (ref 10–15)
EST. AVERAGE GLUCOSE BLD GHB EST-MCNC: 105 MG/DL
FASTING STATUS PATIENT QL REPORTED: YES
FASTING STATUS PATIENT QL REPORTED: YES
GLUCOSE SERPL-MCNC: 122 MG/DL (ref 70–99)
HBA1C MFR BLD: 5.3 % (ref 0–5.6)
HCO3 SERPL-SCNC: 24 MMOL/L (ref 22–29)
HCT VFR BLD AUTO: 42.1 % (ref 40–53)
HDLC SERPL-MCNC: 27 MG/DL
HGB BLD-MCNC: 15 G/DL (ref 13.3–17.7)
LDLC SERPL CALC-MCNC: 86 MG/DL
MCH RBC QN AUTO: 30.2 PG (ref 26.5–33)
MCHC RBC AUTO-ENTMCNC: 35.6 G/DL (ref 31.5–36.5)
MCV RBC AUTO: 85 FL (ref 78–100)
NONHDLC SERPL-MCNC: 140 MG/DL
PLATELET # BLD AUTO: 183 10E3/UL (ref 150–450)
POTASSIUM SERPL-SCNC: 4.1 MMOL/L (ref 3.4–5.3)
PROT SERPL-MCNC: 7.4 G/DL (ref 6.4–8.3)
RBC # BLD AUTO: 4.97 10E6/UL (ref 4.4–5.9)
SODIUM SERPL-SCNC: 139 MMOL/L (ref 135–145)
TRIGL SERPL-MCNC: 270 MG/DL
TSH SERPL DL<=0.005 MIU/L-ACNC: 1.12 UIU/ML (ref 0.3–4.2)
WBC # BLD AUTO: 6.5 10E3/UL (ref 4–11)

## 2024-12-31 PROCEDURE — 99214 OFFICE O/P EST MOD 30 MIN: CPT | Mod: 25 | Performed by: PHYSICIAN ASSISTANT

## 2024-12-31 PROCEDURE — 36415 COLL VENOUS BLD VENIPUNCTURE: CPT | Performed by: PHYSICIAN ASSISTANT

## 2024-12-31 PROCEDURE — 84443 ASSAY THYROID STIM HORMONE: CPT | Performed by: PHYSICIAN ASSISTANT

## 2024-12-31 PROCEDURE — 80053 COMPREHEN METABOLIC PANEL: CPT | Performed by: PHYSICIAN ASSISTANT

## 2024-12-31 PROCEDURE — 83036 HEMOGLOBIN GLYCOSYLATED A1C: CPT | Performed by: PHYSICIAN ASSISTANT

## 2024-12-31 PROCEDURE — 85027 COMPLETE CBC AUTOMATED: CPT | Performed by: PHYSICIAN ASSISTANT

## 2024-12-31 PROCEDURE — 80061 LIPID PANEL: CPT | Performed by: PHYSICIAN ASSISTANT

## 2024-12-31 PROCEDURE — 99396 PREV VISIT EST AGE 40-64: CPT | Performed by: PHYSICIAN ASSISTANT

## 2024-12-31 RX ORDER — TIRZEPATIDE 2.5 MG/.5ML
2.5 INJECTION, SOLUTION SUBCUTANEOUS
Qty: 2 ML | Refills: 0 | Status: SHIPPED | OUTPATIENT
Start: 2024-12-31 | End: 2025-01-22

## 2024-12-31 RX ORDER — TIRZEPATIDE 5 MG/.5ML
5 INJECTION, SOLUTION SUBCUTANEOUS
Qty: 6 ML | Refills: 0 | Status: SHIPPED | OUTPATIENT
Start: 2024-12-31

## 2024-12-31 RX ORDER — CARISOPRODOL 250 MG/1
250 TABLET ORAL AT BEDTIME
Qty: 30 TABLET | Refills: 2 | Status: SHIPPED | OUTPATIENT
Start: 2024-12-31

## 2024-12-31 SDOH — HEALTH STABILITY: PHYSICAL HEALTH: ON AVERAGE, HOW MANY DAYS PER WEEK DO YOU ENGAGE IN MODERATE TO STRENUOUS EXERCISE (LIKE A BRISK WALK)?: 2 DAYS

## 2024-12-31 SDOH — HEALTH STABILITY: PHYSICAL HEALTH: ON AVERAGE, HOW MANY MINUTES DO YOU ENGAGE IN EXERCISE AT THIS LEVEL?: 30 MIN

## 2024-12-31 ASSESSMENT — SOCIAL DETERMINANTS OF HEALTH (SDOH): HOW OFTEN DO YOU GET TOGETHER WITH FRIENDS OR RELATIVES?: ONCE A WEEK

## 2024-12-31 NOTE — TELEPHONE ENCOUNTER
Prior Authorization Retail Medication Request    Medication/Dose: carisoprodol (SOMA) 250 MG tablet  Diagnosis and ICD code (if different than what is on RX):  See Chart  New/renewal/insurance change PA/secondary ins. PA:  Previously Tried and Failed:  See Chart  Rationale:  See Chart    Insurance   Primary: BCBS  Insurance ID:  [id number]    Secondary (if applicable):   Insurance ID:       Pharmacy Information (if different than what is on RX)  Name:  [facility]  Phone:  [phone]  Fax:[phone]    Clinic Information  Preferred routing pool for dept communication: [facility]

## 2024-12-31 NOTE — TELEPHONE ENCOUNTER
PA Initiation    Medication: CARISOPRODOL 250 MG PO TABS  Insurance Company: [employer] - Phone [phone] Fax [phone]  Pharmacy Filling the Rx: [id number] - [place], [place] - [address] AT [facility] [address]  Filling Pharmacy Phone: [phone]  Filling Pharmacy Fax:    Start Date: 12/31/2024

## 2024-12-31 NOTE — PROGRESS NOTES
Preventive Care Visit  [facility]  Kashif Soliz PA-C, Family Medicine  Dec 31, 2024      Assessment & Plan     Annual physical exam  Overall doing well.  Will update screening lab  - CBC with platelets; Future  - Comprehensive metabolic panel (BMP + Alb, Alk Phos, ALT, AST, Total. Bili, TP); Future  - CBC with platelets  - Comprehensive metabolic panel (BMP + Alb, Alk Phos, ALT, AST, Total. Bili, TP)    Hyperlipidemia LDL goal <100  Last lipid panel was mildly elevated.  Diet and exercise discussed today.  Will check a fasting lipid panel  - Lipid panel reflex to direct LDL Fasting; Future  - Lipid panel reflex to direct LDL Fasting    Pulmonary nodules  Noted on CT chest 12/8/2023.  Noted a stable right minor fissure pulmonary nodule which did not require any further follow-up.    Enlargement, spleen  Noted on ultrasound 12/8/2023 which showed splenomegaly with spleen measuring 15.5 cm.  Oncology was consulted and did not feel that there was anything concerning.  Continue to monitor blood counts.    MARCELINO (obstructive sleep apnea)  Compliant with CPAP.  DME order placed for supplies  - CPAP Order for DME - ONLY FOR DME    Morbid obesity (H)  BMI 44.55.  Interested in starting weight loss medication.  Limited activity due to chronic shoulder and left knee pain.  He continues to follow with Ortho.  He continues to track his calories and protein intake.  Tightness excise discussed today.  Will start him on Zepbound 2.5 mg for 1 month and then increase to 5 mg.  Denies any family history of medullary thyroid cancer.  We did discuss side effects including nausea, vomiting, constipation and the possibility of acute pancreatitis.  Discussed if he needs Zofran for nausea he is to let me know.  If he develops constipation start MiraLAX.  Recommend following up in clinic in 3 months for recheck.  - ZEPBOUND 2.5 MG/0.5ML prefilled pen; Inject 0.5 mLs (2.5 mg) subcutaneously every 7 days for 4  "doses.  - ZEPBOUND 5 MG/0.5ML prefilled pen; Inject 0.5 mLs (5 mg) subcutaneously every 7 days.  - Hemoglobin A1c; Future  - TSH with free T4 reflex; Future  - Hemoglobin A1c  - TSH with free T4 reflex    Chronic back pain, unspecified back location, unspecified back pain laterality  Chronic upper and mid back pain.  No recent injury.  Suspect that this is muscular in nature.  Was prescribed Flexeril in the past for this pain but cause him to be drowsy the next day.  We will trial Soma start with a half a tablet increase to a full tablet as needed.  Take this as needed at bedtime.  Side effects of the medication discussed including drowsiness.  - carisoprodol (SOMA) 250 MG tablet; Take 1 tablet (250 mg) by mouth at bedtime.     Follow-up Visit   Expected date:  Dec 31, 2025 (Approximate)      Follow Up Appointment Details:     Follow-up with whom?: Me    Follow-Up for what?: Adult Preventive    How?: In Person                 Current Outpatient Medications   Medication Sig Dispense Refill    carisoprodol (SOMA) 250 MG tablet Take 1 tablet (250 mg) by mouth at bedtime. 30 tablet 2    ZEPBOUND 2.5 MG/0.5ML prefilled pen Inject 0.5 mLs (2.5 mg) subcutaneously every 7 days for 4 doses. 2 mL 0    ZEPBOUND 5 MG/0.5ML prefilled pen Inject 0.5 mLs (5 mg) subcutaneously every 7 days. 6 mL 0     No current facility-administered medications for this visit.         Patient has been advised of split billing requirements and indicates understanding: Yes        BMI  Estimated body mass index is 44.55 kg/m  as calculated from the following:    Height as of this encounter: 1.676 m (5' 6\").    Weight as of this encounter: 125.2 kg (276 lb).   Weight management plan: Discussed healthy diet and exercise guidelines    Counseling  Appropriate preventive services were addressed with this patient via screening, questionnaire, or discussion as appropriate for fall prevention, nutrition, physical activity, Tobacco-use cessation, social " engagement, weight loss and cognition.  Checklist reviewing preventive services available has been given to the patient.  Reviewed patient's diet, addressing concerns and/or questions.   He is at risk for lack of exercise and has been provided with information to increase physical activity for the benefit of his well-being.         Subjective   Toby is a 39 year old, presenting for the following:  Physical (Fasting Physical. Would like to discuss weight loss options)        12/31/2024     8:48 AM   Additional Questions   Roomed by Tomasa Morales   Accompanied by janeth          Toby is a pleasant 39-year-old male who presents to the clinic today for an annual physical.  Overall he is feeling well.  He continues to work with orthopedics on shoulder and left knee pain.  He does have underlying history of's obstructive sleep apnea and compliant with CPAP.  He does need CPAP supplies refilled today.    He has been struggling with weight loss.  He states he has been trying to stay as active as he can but with his shoulder and knee pain his activity has been limited.  He is monitoring his calorie and protein intake.  He is interested in trialing medications to help with with weight loss.      Health Care Directive  Patient does not have a Health Care Directive: Discussed advance care planning with patient; however, patient declined at this time.      12/31/2024   General Health   How would you rate your overall physical health? (!) FAIR   Feel stress (tense, anxious, or unable to sleep) Only a little   (!) STRESS CONCERN      12/31/2024   Nutrition   Three or more servings of calcium each day? (!) NO   Diet: Regular (no restrictions)   How many servings of fruit and vegetables per day? (!) 2-3   How many sweetened beverages each day? 0-1         12/31/2024   Exercise   Days per week of moderate/strenous exercise 2 days   Average minutes spent exercising at this level 30 min   (!) EXERCISE CONCERN      12/31/2024   Social  Factors   Frequency of gathering with friends or relatives Once a week   Worry food won't last until get money to buy more Yes   Food not last or not have enough money for food? No   Do you have housing? (Housing is defined as stable permanent housing and does not include staying ouside in a car, in a tent, in an abandoned building, in an overnight shelter, or couch-surfing.) Yes   Are you worried about losing your housing? No   Lack of transportation? No   Unable to get utilities (heat,electricity)? No   (!) FOOD SECURITY CONCERN PRESENT      12/31/2024   Dental   Dentist two times every year? Yes         12/31/2024   TB Screening   Were you born outside of the [place]? No         Today's PHQ-2 Score:       12/31/2024     8:49 AM   PHQ-2 ( 1999 [employer])   Q1: Little interest or pleasure in doing things 0   Q2: Feeling down, depressed or hopeless 0   PHQ-2 Score 0    Q1: Little interest or pleasure in doing things Not at all   Q2: Feeling down, depressed or hopeless Not at all   PHQ-2 Score 0       Patient-reported           12/31/2024   Substance Use   Alcohol more than 3/day or more than 7/wk No   Do you use any other substances recreationally? No     Social History     Tobacco Use    Smoking status: Never     Passive exposure: Never    Smokeless tobacco: Never   Vaping Use    Vaping status: Never Used   Substance Use Topics    Alcohol use: No    Drug use: No           12/31/2024   STI Screening   New sexual partner(s) since last STI/HIV test? No   ASCVD Risk   The 10-year ASCVD risk score (Maty PLASCENCIA, et al., 2019) is: 2%    Values used to calculate the score:      Age: 39 years      Sex: Male      Is Non- : No      Diabetic: No      Tobacco smoker: No      Systolic Blood Pressure: 130 mmHg      Is BP treated: No      HDL Cholesterol: 29 mg/dL      Total Cholesterol: 168 mg/dL        12/31/2024   Contraception/Family Planning   Questions about contraception or family planning No       "  Reviewed and updated as needed this visit by Provider                    Past Medical History:   Diagnosis Date    Back pain     Obesity     Sleep apnea     uses CPAP     Past Surgical History:   Procedure Laterality Date    AMPUTATE FINGER(S) Left     lost very tip of finger in doorframe            Objective    Exam  /88 (BP Location: Right arm, Patient Position: Sitting, Cuff Size: Adult Regular)   Pulse 65   Temp 98.1  F (36.7  C) (Oral)   Resp 16   Ht 1.676 m (5' 6\")   Wt 125.2 kg (276 lb)   SpO2 97%   BMI 44.55 kg/m     Estimated body mass index is 44.55 kg/m  as calculated from the following:    Height as of this encounter: 1.676 m (5' 6\").    Weight as of this encounter: 125.2 kg (276 lb).    Physical Exam  Vitals and nursing note reviewed.   Constitutional:       General: He is not in acute distress.     Appearance: Normal appearance. He is well-developed and well-groomed. He is not ill-appearing or toxic-appearing.   HENT:      Head: Normocephalic and atraumatic.      Right Ear: Tympanic membrane, ear canal and external ear normal.      Left Ear: Tympanic membrane, ear canal and external ear normal.      Mouth/Throat:      Lips: Pink.      Mouth: Mucous membranes are moist.      Palate: No mass.      Pharynx: Oropharynx is clear. Uvula midline.      Tonsils: No tonsillar exudate or tonsillar abscesses.   Eyes:      General: Lids are normal.         Right eye: No discharge.         Left eye: No discharge.   Neck:      Trachea: Trachea normal.   Cardiovascular:      Rate and Rhythm: Normal rate and regular rhythm.      Heart sounds: S1 normal and S2 normal. No murmur heard.  Pulmonary:      Effort: Pulmonary effort is normal.      Breath sounds: Normal breath sounds and air entry.   Abdominal:      General: Bowel sounds are normal. There is no distension.      Palpations: Abdomen is soft.      Tenderness: There is no abdominal tenderness. There is no guarding or rebound.   Musculoskeletal:      " Cervical back: Full passive range of motion without pain and neck supple.      Right lower leg: No edema.      Left lower leg: No edema.   Lymphadenopathy:      Cervical: No cervical adenopathy.   Skin:     General: Skin is warm and dry.      Findings: No lesion or rash.   Neurological:      General: No focal deficit present.      Mental Status: He is alert.      Cranial Nerves: No cranial nerve deficit.      Gait: Gait is intact.      Deep Tendon Reflexes:      Reflex Scores:       Patellar reflexes are 2+ on the right side and 2+ on the left side.  Psychiatric:         Attention and Perception: Attention normal.         Mood and Affect: Mood normal.         Speech: Speech normal.         Signed Electronically by: Kashif Soliz PA-C

## 2025-01-02 NOTE — TELEPHONE ENCOUNTER
PRIOR AUTHORIZATION DENIED    Medication: CARISOPRODOL 250 MG PO TABS  Insurance Company: [employer] - Phone [phone] Fax [phone]  Denial Date: 1/1/2025  Denial Reason(s): Insurance states that patient does not meet coverage criteria. Please see denial letter below for more details.     Appeal Information:       Patient Notified: NO

## 2025-01-03 ENCOUNTER — TRANSFERRED RECORDS (OUTPATIENT)
Dept: HEALTH INFORMATION MANAGEMENT | Facility: CLINIC | Age: 40
End: 2025-01-03

## 2025-01-13 NOTE — TELEPHONE ENCOUNTER
Incoming call from patient, wondering if Kashif Soliz will be appealing or will there be another medication sent in for this, please advise and call patient back with an update.

## 2025-01-15 ENCOUNTER — TELEPHONE (OUTPATIENT)
Dept: FAMILY MEDICINE | Facility: CLINIC | Age: 40
End: 2025-01-15
Payer: COMMERCIAL

## 2025-01-15 DIAGNOSIS — E66.01 MORBID OBESITY (H): Primary | ICD-10-CM

## 2025-01-15 NOTE — TELEPHONE ENCOUNTER
Prior Authorization Retail Medication Request    Medication/Dose: ZEPBOUND 5MG/0.5ML prefilled pen  Diagnosis and ICD code (if different than what is on RX):  see chart  New/renewal/insurance change PA/secondary ins. PA:  Previously Tried and Failed:  see chart  Rationale:  see chart    Insurance   Primary: [employer] Plus  Insurance ID: [id number]       Clinic Information  Preferred routing pool for dept communication: [facility] [place]

## 2025-01-18 NOTE — TELEPHONE ENCOUNTER
Retail Pharmacy Prior Authorization Team   Phone: [phone]    PA Initiation    Medication: ZEPBOUND 5 MG/0.5ML SC SOAJ  Insurance Company: [employer] - Phone [phone] Fax [phone]  Pharmacy Filling the Rx: [id number] [address] AT [facility] [address]  Filling Pharmacy Phone: [phone]  Filling Pharmacy Fax:    Start Date: 1/18/2025    [id number]

## 2025-01-20 RX ORDER — TIZANIDINE 2 MG/1
2 TABLET ORAL 3 TIMES DAILY
Qty: 30 TABLET | Refills: 1 | Status: SHIPPED | OUTPATIENT
Start: 2025-01-20

## 2025-01-20 NOTE — TELEPHONE ENCOUNTER
PRIOR AUTHORIZATION DENIED    Medication: ZEPBOUND 5 MG/0.5ML SC SOAJ  Insurance Company: [employer] - Phone [phone] Fax [phone]  Denial Date: 1/19/2025  Denial Reason(s):               Appeal Information:         Patient Notified: No

## 2025-01-20 NOTE — TELEPHONE ENCOUNTER
Please call patient and let him know that his insurance will not cover Zepbound but it appears that it should cover Wegovy.  New prescription sent to the pharmacy.  He is to start Wegovy 0.25 mg once a week for 1 month and then increase to 0.5 mg after that.  Follow-up with me in clinic in 3 months

## 2025-01-20 NOTE — TELEPHONE ENCOUNTER
Patient informed. States he will call back to schedule.     Patient inquired about muscle relaxer PA. See 12/31 phone encounter.     Aure Keith RN

## 2025-01-21 ENCOUNTER — TELEPHONE (OUTPATIENT)
Dept: FAMILY MEDICINE | Facility: CLINIC | Age: 40
End: 2025-01-21
Payer: COMMERCIAL

## 2025-01-21 NOTE — TELEPHONE ENCOUNTER
Prior Authorization Retail Medication Request    Medication/Dose: Semaglutide-Weight Management (WEGOVY) 0.5 MG/0.5ML pen  Diagnosis and ICD code (if different than what is on RX):  See Chart  New/renewal/insurance change PA/secondary ins. PA:  Previously Tried and Failed:  See Chart  Rationale:  See Chart    Insurance   Primary: LM with patient to call back with current insurance  Insurance ID:       Secondary (if applicable):   Insurance ID:       Pharmacy Information (if different than what is on RX)  [facility]  Phone:  [phone]   Fax:[phone]  [facility] CODE: [id number]    Clinic Information  Preferred routing pool for dept communication: [facility]

## 2025-01-21 NOTE — LETTER
January 22, 2025      Jose Rapp  [address] [facility] [address]        To Whom It May Concern,     I am the primary care provider of Mr. Rapp.  Prescription for Wegovy was submitted.  It appears that this has been denied by [facility].  He is currently morbidly obese with a BMI of 44.  To prevent any further cor-morbidities I would asked that you cover Wegovy to aid in weight loss.  Thank you for your consideration      Sincerely,        Kashif Soliz PA-C    Electronically signed

## 2025-01-22 NOTE — TELEPHONE ENCOUNTER
Retail Pharmacy Prior Authorization Team   Phone: [phone]    PRIOR AUTHORIZATION DENIED    Medication: WEGOVY 0.25 MG/0.5ML SC SOAJ  Insurance Company: [employer] [place] - Phone [phone] Fax [phone]  Denial Date: 1/21/2025  Denial Reason(s): MUST BE ON A COMPLETE WEIGHT MANAGEMENT REGIMEN -       Appeal Information: IF THE PROVIDER WOULD LIKE TO APPEAL THIS DECISION PLEASE PROVIDE THE PA TEAM WITH A LETTER OF MEDICAL NECESSITY      Patient Notified: NO

## 2025-01-22 NOTE — TELEPHONE ENCOUNTER
Medication Appeal Initiation    Medication: WEGOVY 0.25 MG/0.5ML SC SOAJ  Appeal Start Date:  1/22/2025  Insurance Company: [employer] [place]  Insurance Phone: [phone]  Insurance Fax: [phone]  Comments:         FAXED LETTER OF MEDICAL NECESSITY AND CHART NOTES TO INSURANCE -

## 2025-01-22 NOTE — TELEPHONE ENCOUNTER
MEDICATION APPEAL APPROVED    Medication: WEGOVY 0.25 MG/0.5ML SC SOAJ  Authorization Effective Date: 1/1/2025  Authorization Expiration Date: 1/22/2026  Appeal Insurance Company: [employer] [place]  Filling Pharmacy: [id number] [place], [place] - [address] AT [place] [address]  Patient Notified: YES (faxed approval letter to pharmacy and notified patient via [employer] message)  Comments:

## 2025-03-14 ENCOUNTER — TRANSFERRED RECORDS (OUTPATIENT)
Dept: HEALTH INFORMATION MANAGEMENT | Facility: CLINIC | Age: 40
End: 2025-03-14
Payer: COMMERCIAL

## 2025-03-26 ENCOUNTER — OFFICE VISIT (OUTPATIENT)
Dept: FAMILY MEDICINE | Facility: CLINIC | Age: 40
End: 2025-03-26
Payer: COMMERCIAL

## 2025-03-26 VITALS
TEMPERATURE: 97.7 F | RESPIRATION RATE: 16 BRPM | HEIGHT: 66 IN | HEART RATE: 69 BPM | OXYGEN SATURATION: 97 % | WEIGHT: 264.9 LBS | DIASTOLIC BLOOD PRESSURE: 82 MMHG | BODY MASS INDEX: 42.57 KG/M2 | SYSTOLIC BLOOD PRESSURE: 116 MMHG

## 2025-03-26 DIAGNOSIS — E66.01 MORBID OBESITY (H): Primary | ICD-10-CM

## 2025-03-26 PROCEDURE — 99213 OFFICE O/P EST LOW 20 MIN: CPT | Performed by: PHYSICIAN ASSISTANT

## 2025-03-26 PROCEDURE — G2211 COMPLEX E/M VISIT ADD ON: HCPCS | Performed by: PHYSICIAN ASSISTANT

## 2025-03-26 ASSESSMENT — PAIN SCALES - GENERAL: PAINLEVEL_OUTOF10: NO PAIN (0)

## 2025-03-26 NOTE — PROGRESS NOTES
Assessment & Plan     Morbid obesity (H)  Toby is currently on Wegovy 0.5 mg weekly.  He is tolerating the medication without side effects.  Over the last week or 2 he has noted some plateau in weight.  Since starting the Wegovy he is down about 10 pounds.  Weight is 264 pounds with a BMI of 43.  We will have him increase Wegovy to 1 mg weekly.  Will plan to stay on the 1 mg for the next 2 to 3 months.  If he notices that he is having plateau in weights before then he is to let me know and I will send in an increased dose of 1.7 mg at that time.    The longitudinal plan of care for the diagnosis(es)/condition(s) as documented were addressed during this visit. Due to the added complexity in care, I will continue to support Toby in the subsequent management and with ongoing continuity of care.    Total time spent was 15 minutes including reviewing records prior to arrival, consultation, placing orders, education, and reviewing the plan of care on the date of service.      Subjective   Toby is a 39 year old, presenting for the following health issues:  Recheck Medication (Pt is not fasting. )        3/26/2025     3:22 PM   Additional Questions   Roomed by Jennifer ALCANTAR LPN     Toby is a pleasant 39-year-old male presents to the clinic today to follow-up on obesity.  December 2024 started on Wegovy.  He is currently on 0.5 mg weekly.  Tolerating the medication well.  He is down about 10 pounds since starting the Wegovy.  Since starting the medication he has noticed that he is having less hunger, less food noise, and his portion sizes have decreased.  He is not having any side effects of the medication.  His goal weight would be to get to around 200 pounds.  He would like to continue medications as he is seeing some improvement with weight loss.  He has noted over the last couple weeks that his weight has plateaued.    History of Present Illness       Reason for visit:  Wegovy check up    He eats 2-3 servings of fruits and  "vegetables daily.He consumes 0 sweetened beverage(s) daily.He exercises with enough effort to increase his heart rate 20 to 29 minutes per day.  He exercises with enough effort to increase his heart rate 5 days per week.   He is taking medications regularly.          Objective    /82 (BP Location: Left arm, Patient Position: Sitting, Cuff Size: Adult Regular)   Pulse 69   Temp 97.7  F (36.5  C) (Oral)   Resp 16   Ht 1.671 m (5' 5.8\")   Wt 120.2 kg (264 lb 14.4 oz)   SpO2 97%   BMI 43.02 kg/m    Body mass index is 43.02 kg/m .  Physical Exam  Vitals and nursing note reviewed.   Constitutional:       General: He is not in acute distress.     Appearance: Normal appearance. He is not ill-appearing, toxic-appearing or diaphoretic.   Eyes:      Conjunctiva/sclera: Conjunctivae normal.   Cardiovascular:      Rate and Rhythm: Normal rate and regular rhythm.      Heart sounds: No murmur heard.     No friction rub. No gallop.   Skin:     General: Skin is warm and dry.   Neurological:      Mental Status: He is alert.   Psychiatric:         Mood and Affect: Mood normal.         Behavior: Behavior normal.         Thought Content: Thought content normal.         Judgment: Judgment normal.                Signed Electronically by: Kashif Soliz PA-C    "

## 2025-03-31 ENCOUNTER — TRANSFERRED RECORDS (OUTPATIENT)
Dept: HEALTH INFORMATION MANAGEMENT | Facility: CLINIC | Age: 40
End: 2025-03-31
Payer: COMMERCIAL

## 2025-04-09 ENCOUNTER — TRANSFERRED RECORDS (OUTPATIENT)
Dept: HEALTH INFORMATION MANAGEMENT | Facility: CLINIC | Age: 40
End: 2025-04-09
Payer: COMMERCIAL

## 2025-05-23 ENCOUNTER — MYC MEDICAL ADVICE (OUTPATIENT)
Dept: FAMILY MEDICINE | Facility: CLINIC | Age: 40
End: 2025-05-23
Payer: COMMERCIAL

## 2025-05-23 DIAGNOSIS — E66.01 MORBID OBESITY (H): Primary | ICD-10-CM

## 2025-05-26 ENCOUNTER — HOSPITAL ENCOUNTER (EMERGENCY)
Facility: HOSPITAL | Age: 40
End: 2025-05-26
Payer: COMMERCIAL

## 2025-05-27 ENCOUNTER — TELEPHONE (OUTPATIENT)
Dept: FAMILY MEDICINE | Facility: CLINIC | Age: 40
End: 2025-05-27
Payer: COMMERCIAL

## 2025-05-27 DIAGNOSIS — E66.01 MORBID OBESITY (H): Primary | ICD-10-CM

## 2025-05-27 NOTE — TELEPHONE ENCOUNTER
Semaglutide-Weight Management (WEGOVY) 1 MG/0.5ML pen the directions state Inject 1.7 mg subcutaneously once a week.  Is this the correct dose?  It doesn't match the directions-Thanks

## 2025-05-27 NOTE — TELEPHONE ENCOUNTER
Per last office note, ok to increase from 1mg to 1.7mg. Increased dose pended.    Tomasa Morales MA on 5/27/2025 at 8:04 AM